# Patient Record
Sex: MALE | Race: WHITE | NOT HISPANIC OR LATINO | URBAN - METROPOLITAN AREA
[De-identification: names, ages, dates, MRNs, and addresses within clinical notes are randomized per-mention and may not be internally consistent; named-entity substitution may affect disease eponyms.]

---

## 2017-08-30 ENCOUNTER — EMERGENCY (EMERGENCY)
Facility: HOSPITAL | Age: 47
LOS: 1 days | Discharge: HOME | End: 2017-08-30
Attending: EMERGENCY MEDICINE
Payer: COMMERCIAL

## 2017-08-30 VITALS
TEMPERATURE: 98 F | RESPIRATION RATE: 18 BRPM | HEART RATE: 68 BPM | OXYGEN SATURATION: 98 % | SYSTOLIC BLOOD PRESSURE: 128 MMHG | DIASTOLIC BLOOD PRESSURE: 80 MMHG | HEIGHT: 66 IN | WEIGHT: 210.1 LBS

## 2017-08-30 PROCEDURE — 99283 EMERGENCY DEPT VISIT LOW MDM: CPT | Mod: 25

## 2017-08-30 PROCEDURE — 93005 ELECTROCARDIOGRAM TRACING: CPT

## 2017-08-30 PROCEDURE — 93010 ELECTROCARDIOGRAM REPORT: CPT

## 2017-08-30 PROCEDURE — 99284 EMERGENCY DEPT VISIT MOD MDM: CPT

## 2017-08-30 RX ORDER — IBUPROFEN 200 MG
1 TABLET ORAL
Qty: 15 | Refills: 0 | OUTPATIENT
Start: 2017-08-30 | End: 2017-09-04

## 2017-08-30 NOTE — ED STATDOCS - MEDICAL DECISION MAKING DETAILS
regfereed pain cardiac appreciated in workup no new neurovasc deficits return intractable pain new onset motor or sensory deficits.

## 2017-08-30 NOTE — ED STATDOCS - OBJECTIVE STATEMENT
46 y/o M pt with hx of cardiac stent, CAD presents to Ed c/o intermittent right elbow pain for 2 weeks. Pt states he has intermittent sharp pain and numbness sensation radiating to his shoulder and upper back. no aggravating or relieving factors. denies fever. denies HA or neck pain. no chest pain or sob. no abd pain. no n/v/d. no urinary f/u/d. no motor or sensory deficits. denies drug use. no recent travel. no rash. no other acute issues symptoms or concerns

## 2018-02-02 ENCOUNTER — APPOINTMENT (OUTPATIENT)
Dept: GASTROENTEROLOGY | Facility: CLINIC | Age: 48
End: 2018-02-02

## 2018-10-12 ENCOUNTER — EMERGENCY (EMERGENCY)
Facility: HOSPITAL | Age: 48
LOS: 1 days | Discharge: HOME | End: 2018-10-12
Attending: EMERGENCY MEDICINE
Payer: COMMERCIAL

## 2018-10-12 VITALS
DIASTOLIC BLOOD PRESSURE: 94 MMHG | RESPIRATION RATE: 20 BRPM | SYSTOLIC BLOOD PRESSURE: 140 MMHG | HEART RATE: 72 BPM | TEMPERATURE: 97 F | OXYGEN SATURATION: 97 %

## 2018-10-12 VITALS — WEIGHT: 220.02 LBS | HEIGHT: 66 IN

## 2018-10-12 LAB
ALBUMIN SERPL ELPH-MCNC: 3.9 G/DL — SIGNIFICANT CHANGE UP (ref 3.3–5.2)
ALP SERPL-CCNC: 69 U/L — SIGNIFICANT CHANGE UP (ref 40–120)
ALT FLD-CCNC: 65 U/L — HIGH
ANION GAP SERPL CALC-SCNC: 13 MMOL/L — SIGNIFICANT CHANGE UP (ref 5–17)
APTT BLD: 31.3 SEC — SIGNIFICANT CHANGE UP (ref 27.5–37.4)
AST SERPL-CCNC: 46 U/L — HIGH
BASOPHILS # BLD AUTO: 0 K/UL — SIGNIFICANT CHANGE UP (ref 0–0.2)
BASOPHILS NFR BLD AUTO: 0.4 % — SIGNIFICANT CHANGE UP (ref 0–2)
BILIRUB SERPL-MCNC: 0.3 MG/DL — LOW (ref 0.4–2)
BUN SERPL-MCNC: 18 MG/DL — SIGNIFICANT CHANGE UP (ref 8–20)
CALCIUM SERPL-MCNC: 9 MG/DL — SIGNIFICANT CHANGE UP (ref 8.6–10.2)
CHLORIDE SERPL-SCNC: 100 MMOL/L — SIGNIFICANT CHANGE UP (ref 98–107)
CK SERPL-CCNC: 226 U/L — HIGH (ref 30–200)
CO2 SERPL-SCNC: 25 MMOL/L — SIGNIFICANT CHANGE UP (ref 22–29)
CREAT SERPL-MCNC: 0.86 MG/DL — SIGNIFICANT CHANGE UP (ref 0.5–1.3)
EOSINOPHIL # BLD AUTO: 0.5 K/UL — SIGNIFICANT CHANGE UP (ref 0–0.5)
EOSINOPHIL NFR BLD AUTO: 5.2 % — SIGNIFICANT CHANGE UP (ref 0–6)
GLUCOSE SERPL-MCNC: 157 MG/DL — HIGH (ref 70–115)
HCT VFR BLD CALC: 39.6 % — LOW (ref 42–52)
HGB BLD-MCNC: 13.3 G/DL — LOW (ref 14–18)
INR BLD: 1.05 RATIO — SIGNIFICANT CHANGE UP (ref 0.88–1.16)
LYMPHOCYTES # BLD AUTO: 1.3 K/UL — SIGNIFICANT CHANGE UP (ref 1–4.8)
LYMPHOCYTES # BLD AUTO: 13.1 % — LOW (ref 20–55)
MAGNESIUM SERPL-MCNC: 2 MG/DL — SIGNIFICANT CHANGE UP (ref 1.6–2.6)
MCHC RBC-ENTMCNC: 29.7 PG — SIGNIFICANT CHANGE UP (ref 27–31)
MCHC RBC-ENTMCNC: 33.6 G/DL — SIGNIFICANT CHANGE UP (ref 32–36)
MCV RBC AUTO: 88.4 FL — SIGNIFICANT CHANGE UP (ref 80–94)
MONOCYTES # BLD AUTO: 0.9 K/UL — HIGH (ref 0–0.8)
MONOCYTES NFR BLD AUTO: 9.4 % — SIGNIFICANT CHANGE UP (ref 3–10)
NEUTROPHILS # BLD AUTO: 6.9 K/UL — SIGNIFICANT CHANGE UP (ref 1.8–8)
NEUTROPHILS NFR BLD AUTO: 71.2 % — SIGNIFICANT CHANGE UP (ref 37–73)
PLATELET # BLD AUTO: 140 K/UL — LOW (ref 150–400)
POTASSIUM SERPL-MCNC: 3.7 MMOL/L — SIGNIFICANT CHANGE UP (ref 3.5–5.3)
POTASSIUM SERPL-SCNC: 3.7 MMOL/L — SIGNIFICANT CHANGE UP (ref 3.5–5.3)
PROT SERPL-MCNC: 6.8 G/DL — SIGNIFICANT CHANGE UP (ref 6.6–8.7)
PROTHROM AB SERPL-ACNC: 11.6 SEC — SIGNIFICANT CHANGE UP (ref 9.8–12.7)
RBC # BLD: 4.48 M/UL — LOW (ref 4.6–6.2)
RBC # FLD: 13.6 % — SIGNIFICANT CHANGE UP (ref 11–15.6)
SODIUM SERPL-SCNC: 138 MMOL/L — SIGNIFICANT CHANGE UP (ref 135–145)
TROPONIN T SERPL-MCNC: <0.01 NG/ML — SIGNIFICANT CHANGE UP (ref 0–0.06)
TROPONIN T SERPL-MCNC: <0.01 NG/ML — SIGNIFICANT CHANGE UP (ref 0–0.06)
WBC # BLD: 9.8 K/UL — SIGNIFICANT CHANGE UP (ref 4.8–10.8)
WBC # FLD AUTO: 9.8 K/UL — SIGNIFICANT CHANGE UP (ref 4.8–10.8)

## 2018-10-12 PROCEDURE — 82553 CREATINE MB FRACTION: CPT

## 2018-10-12 PROCEDURE — 71045 X-RAY EXAM CHEST 1 VIEW: CPT

## 2018-10-12 PROCEDURE — 84484 ASSAY OF TROPONIN QUANT: CPT

## 2018-10-12 PROCEDURE — 71045 X-RAY EXAM CHEST 1 VIEW: CPT | Mod: 26

## 2018-10-12 PROCEDURE — 93005 ELECTROCARDIOGRAM TRACING: CPT

## 2018-10-12 PROCEDURE — 80053 COMPREHEN METABOLIC PANEL: CPT

## 2018-10-12 PROCEDURE — 36415 COLL VENOUS BLD VENIPUNCTURE: CPT

## 2018-10-12 PROCEDURE — 83735 ASSAY OF MAGNESIUM: CPT

## 2018-10-12 PROCEDURE — 99284 EMERGENCY DEPT VISIT MOD MDM: CPT | Mod: 25

## 2018-10-12 PROCEDURE — 85027 COMPLETE CBC AUTOMATED: CPT

## 2018-10-12 PROCEDURE — 93010 ELECTROCARDIOGRAM REPORT: CPT

## 2018-10-12 PROCEDURE — 85379 FIBRIN DEGRADATION QUANT: CPT

## 2018-10-12 PROCEDURE — 85730 THROMBOPLASTIN TIME PARTIAL: CPT

## 2018-10-12 PROCEDURE — 85610 PROTHROMBIN TIME: CPT

## 2018-10-12 PROCEDURE — 99285 EMERGENCY DEPT VISIT HI MDM: CPT

## 2018-10-12 PROCEDURE — 82550 ASSAY OF CK (CPK): CPT

## 2018-10-12 RX ORDER — KETOROLAC TROMETHAMINE 30 MG/ML
15 SYRINGE (ML) INJECTION ONCE
Qty: 0 | Refills: 0 | Status: DISCONTINUED | OUTPATIENT
Start: 2018-10-12 | End: 2018-10-12

## 2018-10-12 NOTE — ED PROVIDER NOTE - MEDICAL DECISION MAKING DETAILS
Pt presents with reproducible lateral pectoral tenderness which he states does not feel like his prior angina;  EKG normal; low risk HEART score; will check troponin, CXR, d/w cardiology, reevaluate

## 2018-10-12 NOTE — ED PROVIDER NOTE - ATTENDING CONTRIBUTION TO CARE
Dr. Keene: I have personally seen and examined this patient at the bedside. I have fully participated in the care of this patient. I have reviewed all pertinent clinical information, including history, physical exam, plan and the Resident's note and agree except as noted. HPI above as by me. PE above as by me.

## 2018-10-12 NOTE — ED ADULT NURSE NOTE - OBJECTIVE STATEMENT
pt comes to ED with reports of chest pain to right side of his chest. pt is in no distress, noSOB, reports feels increase in pain upon inspiration. no extrmeity swelling, AOX3, JAQUEZ with strength and purpose. skin warm dry and intact. even and unlabored resps noted. hx 4 stensts, last placed 2010, f/u with [facility] cardiology. states he droive to [place] and was away for 2 weeks and "forgot his meds." pt states he hadn't taken his anticoag while away. no calf pain.

## 2018-10-12 NOTE — ED PROVIDER NOTE - PROGRESS NOTE DETAILS
Case discussed with Dr. Minaya who agrees with plan. Case discussed with Dr. Minaya who agrees with plan to discharge if 2nd troponin negative. Results d/w patient. He feels well for discharge. HE states he has pending outpatient stress test coming up and will f/u with his cardiologist.

## 2018-10-12 NOTE — ED ADULT NURSE REASSESSMENT NOTE - NS ED NURSE REASSESS COMMENT FT1
pt remains in no distress, states he feels good and wants to go home after all labs. awaiting trop result. even and unlabored resps, ambulating around ED with no assistance.

## 2018-10-12 NOTE — ED STATDOCS - OBJECTIVE STATEMENT
Telemedicine assessment was conducted (using real time 2 way audio-video technology) by Dr. Alonzo Salas located at [address] [place] [address]  ++++++++++++++++++++++++  Pertinent patient history and initial plan:   47 yo male with hx htn, cad with 3 stents, c/o right sided chest pain today, with sob. reports pain radiated to shoulder and back  denies nausea or vomiting  last stent x 6 years ago  takes aspirin daily  EKG NSR    will do cardiac workup    Patient seen by me in intake for initial assessment and ordering. Physician on site to follow results and further evaluate and treat patient.

## 2018-10-12 NOTE — ED PROVIDER NOTE - MUSCULOSKELETAL, MLM
Spine appears normal, range of motion is not limited, no muscle or joint tenderness. No LE swelling, negative calf pain squeeze, negative Homans sign. Spine appears normal, range of motion is not limited. No LE swelling, negative calf pain squeeze, negative Homans sign. Spine appears normal, range of motion is not limited. No LE swelling, negative calf pain squeeze, negative Homans sign. Reproducible tenderness to palpation of R lateral pectoralis muscle; pain is reproducible with ROM of R shoulder

## 2018-10-12 NOTE — ED PROVIDER NOTE - OBJECTIVE STATEMENT
49 yo M w/PMH of CAD s/p 3 stents (2012), HTN, HLD who presents to the ED for right upper chest and shoulder pain that has been persistent for about 2 weeks. Per patient, he drove down to [place] and back at the end of September and since then has had this pain. Patient states it is worse with deep inspiration and extension of his right shoulder. States he has tried alieve at home without relief. Does smoke cigars, more so recently with trip to [place]. Also was poorly compliant with his medications in [place]. Per patient, does not have a PCP, but uses physicians at work to prescribe medication. Has not had stress test since stent placement. Denies HA, lightheadedness, SOB, abd pain, N/V/D, fever, chills, LE swelling. 47 yo M w/PMH of CAD s/p 3 stents (2012), asthma, HLD who presents to the ED for right upper chest and shoulder pain that has been persistent for about 2 weeks. Per patient, he drove down to [place] and back at the end of September and since then has had this pain. Patient states it is worse with deep inspiration and extension of his right shoulder. States he has tried alieve at home without relief. Does smoke cigars, more so recently with trip to [place]. Also was poorly compliant with his medications in [place]. Per patient, does not have a PCP, but uses physicians at work to prescribe medication. Has not had stress test since stent placement. Denies HA, lightheadedness, SOB, abd pain, N/V/D, fever, chills, LE swelling. 49 yo M w/PMH of CAD s/p 3 stents (2012), asthma, HLD who presents to the ED for right upper chest and shoulder pain that has been persistent for about 2 weeks. Per patient, he drove down to [place] and back at the end of September, bought a motorcycle and feel that maneuvering the bike has caused his pain. Patient states it is worse with deep inspiration and extension of his right shoulder. States he has tried alleve at home without relief. Does smoke cigars, more so recently with trip to [place]. Also was poorly compliant with his medications in [place]. Per patient, does not have a PCP, but uses physicians at work to prescribe medication. Has not had stress test since stent placement. Denies HA, lightheadedness, SOB, abd pain, N/V/D, fever, chills, LE swelling. Patient states that he is not sure if he ever had a heart attack, but states that this pain is completely different from his angina, with which his pain was left sided and radiated in to the L arm, associated with dyspnea.

## 2018-12-13 NOTE — ED STATDOCS - CONDITION AT DISCHARGE:
Pt instructed NPO after Midnight, Bring  Form of ID location to check in no lotions makeup just have clean skin  Allowed only one visitor in pre op
Satisfactory

## 2021-11-12 ENCOUNTER — OFFICE VISIT (OUTPATIENT)
Dept: URGENT CARE | Facility: CLINIC | Age: 51
End: 2021-11-12

## 2021-11-12 ENCOUNTER — APPOINTMENT (OUTPATIENT)
Dept: RADIOLOGY | Facility: CLINIC | Age: 51
End: 2021-11-12

## 2021-11-12 DIAGNOSIS — Z02.1 PHYSICAL EXAM, PRE-EMPLOYMENT: ICD-10-CM

## 2021-11-12 DIAGNOSIS — Z02.1 PHYSICAL EXAM, PRE-EMPLOYMENT: Primary | ICD-10-CM

## 2021-11-12 PROCEDURE — 71046 X-RAY EXAM CHEST 2 VIEWS: CPT

## 2021-11-23 ENCOUNTER — OFFICE VISIT (OUTPATIENT)
Dept: FAMILY MEDICINE CLINIC | Facility: CLINIC | Age: 51
End: 2021-11-23
Payer: COMMERCIAL

## 2021-11-23 ENCOUNTER — TELEPHONE (OUTPATIENT)
Dept: ADMINISTRATIVE | Facility: OTHER | Age: 51
End: 2021-11-23

## 2021-11-23 VITALS
HEIGHT: 63 IN | WEIGHT: 190.4 LBS | RESPIRATION RATE: 18 BRPM | DIASTOLIC BLOOD PRESSURE: 72 MMHG | TEMPERATURE: 97.4 F | BODY MASS INDEX: 33.73 KG/M2 | SYSTOLIC BLOOD PRESSURE: 132 MMHG | HEART RATE: 69 BPM | OXYGEN SATURATION: 98 %

## 2021-11-23 DIAGNOSIS — J30.2 SEASONAL ALLERGIES: ICD-10-CM

## 2021-11-23 DIAGNOSIS — Z76.89 ENCOUNTER TO ESTABLISH CARE: ICD-10-CM

## 2021-11-23 DIAGNOSIS — Z13.29 SCREENING FOR THYROID DISORDER: ICD-10-CM

## 2021-11-23 DIAGNOSIS — Z12.5 SCREENING FOR PROSTATE CANCER: ICD-10-CM

## 2021-11-23 DIAGNOSIS — G56.03 BILATERAL CARPAL TUNNEL SYNDROME: ICD-10-CM

## 2021-11-23 DIAGNOSIS — E78.5 HYPERLIPIDEMIA, UNSPECIFIED HYPERLIPIDEMIA TYPE: Primary | ICD-10-CM

## 2021-11-23 DIAGNOSIS — Z87.442 HISTORY OF KIDNEY STONES: ICD-10-CM

## 2021-11-23 DIAGNOSIS — H02.9 LESION OF EYELID: ICD-10-CM

## 2021-11-23 DIAGNOSIS — Z23 ENCOUNTER FOR IMMUNIZATION: ICD-10-CM

## 2021-11-23 PROBLEM — M54.9 CHRONIC BILATERAL BACK PAIN: Status: ACTIVE | Noted: 2021-11-23

## 2021-11-23 PROBLEM — G89.29 CHRONIC BILATERAL BACK PAIN: Status: ACTIVE | Noted: 2021-11-23

## 2021-11-23 PROCEDURE — 1036F TOBACCO NON-USER: CPT | Performed by: FAMILY MEDICINE

## 2021-11-23 PROCEDURE — 3725F SCREEN DEPRESSION PERFORMED: CPT | Performed by: FAMILY MEDICINE

## 2021-11-23 PROCEDURE — 99204 OFFICE O/P NEW MOD 45 MIN: CPT | Performed by: FAMILY MEDICINE

## 2021-11-23 PROCEDURE — 90682 RIV4 VACC RECOMBINANT DNA IM: CPT

## 2021-11-23 PROCEDURE — 90471 IMMUNIZATION ADMIN: CPT

## 2021-11-23 PROCEDURE — 3008F BODY MASS INDEX DOCD: CPT | Performed by: FAMILY MEDICINE

## 2021-11-23 RX ORDER — COVID-19 MOLECULAR TEST ASSAY
KIT MISCELLANEOUS
COMMUNITY
Start: 2021-09-19 | End: 2021-11-23

## 2021-11-23 RX ORDER — CETIRIZINE HYDROCHLORIDE 10 MG/1
10 TABLET ORAL DAILY
COMMUNITY

## 2021-11-24 PROBLEM — H02.9 LESION OF EYELID: Status: ACTIVE | Noted: 2021-11-24

## 2021-12-28 ENCOUNTER — TELEPHONE (OUTPATIENT)
Dept: FAMILY MEDICINE CLINIC | Facility: CLINIC | Age: 51
End: 2021-12-28

## 2021-12-28 DIAGNOSIS — R09.81 CONGESTION OF NASAL SINUS: ICD-10-CM

## 2021-12-28 DIAGNOSIS — R50.9 FEVER, UNSPECIFIED FEVER CAUSE: Primary | ICD-10-CM

## 2021-12-28 DIAGNOSIS — R53.83 OTHER FATIGUE: ICD-10-CM

## 2021-12-28 DIAGNOSIS — R52 BODY ACHES: ICD-10-CM

## 2021-12-28 DIAGNOSIS — R06.02 SOB (SHORTNESS OF BREATH): ICD-10-CM

## 2021-12-28 DIAGNOSIS — R51.9 NONINTRACTABLE HEADACHE, UNSPECIFIED CHRONICITY PATTERN, UNSPECIFIED HEADACHE TYPE: ICD-10-CM

## 2021-12-28 DIAGNOSIS — R05.9 COUGH: ICD-10-CM

## 2021-12-28 PROCEDURE — 87636 SARSCOV2 & INF A&B AMP PRB: CPT | Performed by: FAMILY MEDICINE

## 2021-12-28 RX ORDER — PREDNISONE 20 MG/1
40 TABLET ORAL DAILY
Qty: 10 TABLET | Refills: 0 | Status: SHIPPED | OUTPATIENT
Start: 2021-12-28 | End: 2022-01-02

## 2021-12-28 RX ORDER — ALBUTEROL SULFATE 90 UG/1
2 AEROSOL, METERED RESPIRATORY (INHALATION) EVERY 6 HOURS PRN
Qty: 8.5 G | Refills: 5 | Status: SHIPPED | OUTPATIENT
Start: 2021-12-28

## 2021-12-31 LAB
FLUAV RNA RESP QL NAA+PROBE: POSITIVE
FLUBV RNA RESP QL NAA+PROBE: NEGATIVE
SARS-COV-2 RNA RESP QL NAA+PROBE: NEGATIVE

## 2022-05-18 ENCOUNTER — APPOINTMENT (OUTPATIENT)
Dept: LAB | Facility: CLINIC | Age: 52
End: 2022-05-18
Payer: COMMERCIAL

## 2022-05-18 DIAGNOSIS — E78.5 HYPERLIPIDEMIA, UNSPECIFIED HYPERLIPIDEMIA TYPE: ICD-10-CM

## 2022-05-18 DIAGNOSIS — Z12.5 SCREENING FOR PROSTATE CANCER: ICD-10-CM

## 2022-05-18 DIAGNOSIS — Z13.29 SCREENING FOR THYROID DISORDER: ICD-10-CM

## 2022-05-18 LAB
ALBUMIN SERPL BCP-MCNC: 3.9 G/DL (ref 3.5–5)
ALP SERPL-CCNC: 95 U/L (ref 46–116)
ALT SERPL W P-5'-P-CCNC: 86 U/L (ref 12–78)
AMORPH URATE CRY URNS QL MICRO: ABNORMAL
ANION GAP SERPL CALCULATED.3IONS-SCNC: 5 MMOL/L (ref 4–13)
AST SERPL W P-5'-P-CCNC: 38 U/L (ref 5–45)
BACTERIA UR QL AUTO: ABNORMAL /HPF
BASOPHILS # BLD AUTO: 0.09 THOUSANDS/ΜL (ref 0–0.1)
BASOPHILS NFR BLD AUTO: 1 % (ref 0–1)
BILIRUB SERPL-MCNC: 0.52 MG/DL (ref 0.2–1)
BILIRUB UR QL STRIP: NEGATIVE
BUN SERPL-MCNC: 17 MG/DL (ref 5–25)
CALCIUM SERPL-MCNC: 9.4 MG/DL (ref 8.3–10.1)
CHLORIDE SERPL-SCNC: 107 MMOL/L (ref 100–108)
CHOLEST SERPL-MCNC: 222 MG/DL
CLARITY UR: ABNORMAL
CO2 SERPL-SCNC: 25 MMOL/L (ref 21–32)
COLOR UR: ABNORMAL
CREAT SERPL-MCNC: 0.88 MG/DL (ref 0.6–1.3)
EOSINOPHIL # BLD AUTO: 0.44 THOUSAND/ΜL (ref 0–0.61)
EOSINOPHIL NFR BLD AUTO: 5 % (ref 0–6)
ERYTHROCYTE [DISTWIDTH] IN BLOOD BY AUTOMATED COUNT: 12.3 % (ref 11.6–15.1)
GFR SERPL CREATININE-BSD FRML MDRD: 97 ML/MIN/1.73SQ M
GLUCOSE P FAST SERPL-MCNC: 98 MG/DL (ref 65–99)
GLUCOSE UR STRIP-MCNC: NEGATIVE MG/DL
HCT VFR BLD AUTO: 50.1 % (ref 36.5–49.3)
HDLC SERPL-MCNC: 40 MG/DL
HGB BLD-MCNC: 16.7 G/DL (ref 12–17)
HGB UR QL STRIP.AUTO: ABNORMAL
IMM GRANULOCYTES # BLD AUTO: 0.03 THOUSAND/UL (ref 0–0.2)
IMM GRANULOCYTES NFR BLD AUTO: 0 % (ref 0–2)
KETONES UR STRIP-MCNC: NEGATIVE MG/DL
LDLC SERPL CALC-MCNC: 153 MG/DL (ref 0–100)
LEUKOCYTE ESTERASE UR QL STRIP: NEGATIVE
LYMPHOCYTES # BLD AUTO: 3.11 THOUSANDS/ΜL (ref 0.6–4.47)
LYMPHOCYTES NFR BLD AUTO: 38 % (ref 14–44)
MCH RBC QN AUTO: 30.7 PG (ref 26.8–34.3)
MCHC RBC AUTO-ENTMCNC: 33.3 G/DL (ref 31.4–37.4)
MCV RBC AUTO: 92 FL (ref 82–98)
MONOCYTES # BLD AUTO: 0.76 THOUSAND/ΜL (ref 0.17–1.22)
MONOCYTES NFR BLD AUTO: 9 % (ref 4–12)
NEUTROPHILS # BLD AUTO: 3.75 THOUSANDS/ΜL (ref 1.85–7.62)
NEUTS SEG NFR BLD AUTO: 47 % (ref 43–75)
NITRITE UR QL STRIP: NEGATIVE
NON-SQ EPI CELLS URNS QL MICRO: ABNORMAL /HPF
NRBC BLD AUTO-RTO: 0 /100 WBCS
PH UR STRIP.AUTO: 5.5 [PH]
PLATELET # BLD AUTO: 232 THOUSANDS/UL (ref 149–390)
PMV BLD AUTO: 10.9 FL (ref 8.9–12.7)
POTASSIUM SERPL-SCNC: 4.6 MMOL/L (ref 3.5–5.3)
PROT SERPL-MCNC: 8.1 G/DL (ref 6.4–8.2)
PROT UR STRIP-MCNC: NEGATIVE MG/DL
PSA SERPL-MCNC: 1.1 NG/ML (ref 0–4)
RBC # BLD AUTO: 5.44 MILLION/UL (ref 3.88–5.62)
RBC #/AREA URNS AUTO: ABNORMAL /HPF
SODIUM SERPL-SCNC: 137 MMOL/L (ref 136–145)
SP GR UR STRIP.AUTO: 1.02 (ref 1–1.03)
TRIGL SERPL-MCNC: 146 MG/DL
TSH SERPL DL<=0.05 MIU/L-ACNC: 1.39 UIU/ML (ref 0.45–4.5)
UROBILINOGEN UR STRIP-ACNC: <2 MG/DL
WBC # BLD AUTO: 8.18 THOUSAND/UL (ref 4.31–10.16)
WBC #/AREA URNS AUTO: ABNORMAL /HPF

## 2022-05-18 PROCEDURE — 36415 COLL VENOUS BLD VENIPUNCTURE: CPT

## 2022-05-18 PROCEDURE — 80053 COMPREHEN METABOLIC PANEL: CPT

## 2022-05-18 PROCEDURE — 84443 ASSAY THYROID STIM HORMONE: CPT

## 2022-05-18 PROCEDURE — 85025 COMPLETE CBC W/AUTO DIFF WBC: CPT

## 2022-05-18 PROCEDURE — 81001 URINALYSIS AUTO W/SCOPE: CPT | Performed by: FAMILY MEDICINE

## 2022-05-18 PROCEDURE — 80061 LIPID PANEL: CPT

## 2022-05-18 PROCEDURE — G0103 PSA SCREENING: HCPCS

## 2022-05-22 DIAGNOSIS — E78.5 HYPERLIPIDEMIA, UNSPECIFIED HYPERLIPIDEMIA TYPE: Primary | ICD-10-CM

## 2022-05-23 ENCOUNTER — OFFICE VISIT (OUTPATIENT)
Dept: FAMILY MEDICINE CLINIC | Facility: CLINIC | Age: 52
End: 2022-05-23
Payer: COMMERCIAL

## 2022-05-23 ENCOUNTER — HOSPITAL ENCOUNTER (OUTPATIENT)
Dept: ULTRASOUND IMAGING | Facility: HOSPITAL | Age: 52
Discharge: HOME | End: 2022-05-23
Payer: COMMERCIAL

## 2022-05-23 VITALS
WEIGHT: 187 LBS | SYSTOLIC BLOOD PRESSURE: 128 MMHG | OXYGEN SATURATION: 98 % | HEART RATE: 62 BPM | DIASTOLIC BLOOD PRESSURE: 84 MMHG | HEIGHT: 63 IN | BODY MASS INDEX: 33.13 KG/M2 | TEMPERATURE: 98.1 F

## 2022-05-23 DIAGNOSIS — R31.21 ASYMPTOMATIC MICROSCOPIC HEMATURIA: Primary | ICD-10-CM

## 2022-05-23 DIAGNOSIS — R31.21 ASYMPTOMATIC MICROSCOPIC HEMATURIA: ICD-10-CM

## 2022-05-23 DIAGNOSIS — M54.50 CHRONIC BILATERAL LOW BACK PAIN WITHOUT SCIATICA: ICD-10-CM

## 2022-05-23 DIAGNOSIS — G89.29 CHRONIC BILATERAL LOW BACK PAIN WITHOUT SCIATICA: ICD-10-CM

## 2022-05-23 LAB
BILIRUB UR QL STRIP: NEGATIVE
CLARITY UR: ABNORMAL
COLOR UR: ABNORMAL
GLUCOSE UR STRIP-MCNC: NEGATIVE MG/DL
HGB UR QL STRIP.AUTO: NEGATIVE
KETONES UR STRIP-MCNC: NEGATIVE MG/DL
LEUKOCYTE ESTERASE UR QL STRIP: NEGATIVE
NITRITE UR QL STRIP: NEGATIVE
PH UR STRIP.AUTO: 5.5 [PH]
PROT UR STRIP-MCNC: ABNORMAL MG/DL
SL AMB  POCT GLUCOSE, UA: NEGATIVE
SL AMB LEUKOCYTE ESTERASE,UA: NEGATIVE
SL AMB POCT BILIRUBIN,UA: NEGATIVE
SL AMB POCT BLOOD,UA: ABNORMAL
SL AMB POCT CLARITY,UA: CLEAR
SL AMB POCT COLOR,UA: ABNORMAL
SL AMB POCT KETONES,UA: NEGATIVE
SL AMB POCT NITRITE,UA: NEGATIVE
SL AMB POCT PH,UA: 5
SL AMB POCT SPECIFIC GRAVITY,UA: >=1.03
SL AMB POCT URINE PROTEIN: NEGATIVE
SL AMB POCT UROBILINOGEN: 0.2
SP GR UR STRIP.AUTO: 1.02 (ref 1–1.03)
UROBILINOGEN UR STRIP-ACNC: <2 MG/DL

## 2022-05-23 PROCEDURE — 76770 US EXAM ABDO BACK WALL COMP: CPT

## 2022-05-23 PROCEDURE — 99214 OFFICE O/P EST MOD 30 MIN: CPT | Performed by: FAMILY MEDICINE

## 2022-05-23 PROCEDURE — 81001 URINALYSIS AUTO W/SCOPE: CPT | Performed by: FAMILY MEDICINE

## 2022-05-23 PROCEDURE — 3008F BODY MASS INDEX DOCD: CPT | Performed by: FAMILY MEDICINE

## 2022-05-23 PROCEDURE — 1036F TOBACCO NON-USER: CPT | Performed by: FAMILY MEDICINE

## 2022-05-23 PROCEDURE — 81002 URINALYSIS NONAUTO W/O SCOPE: CPT | Performed by: FAMILY MEDICINE

## 2022-05-23 PROCEDURE — 87086 URINE CULTURE/COLONY COUNT: CPT | Performed by: FAMILY MEDICINE

## 2022-05-23 PROCEDURE — 3725F SCREEN DEPRESSION PERFORMED: CPT | Performed by: FAMILY MEDICINE

## 2022-05-23 RX ORDER — LIDOCAINE 50 MG/G
PATCH TOPICAL
COMMUNITY
Start: 2022-05-10

## 2022-05-23 RX ORDER — GABAPENTIN 100 MG/1
CAPSULE ORAL
COMMUNITY
Start: 2022-05-10

## 2022-05-23 NOTE — PROGRESS NOTES
Assessment/Plan:       Problem List Items Addressed This Visit        Other    Chronic bilateral back pain    Relevant Orders    US kidney and bladder    UA w Reflex to Microscopic w Reflex to Culture - Clinic Collect    Urine culture      Other Visit Diagnoses     Asymptomatic microscopic hematuria    -  Primary    Relevant Orders    US kidney and bladder    UA w Reflex to Microscopic w Reflex to Culture - Clinic Collect    Urine culture            Will start with repeat urinalysis, US kidney and bladder  Asymptomatic today  Subjective:      Patient ID: Luba Severe is a 45 y o  male  HPI     Recent urinalysis showed trace blood  Intermittent back pain, No blood in urine or dysuria  No fevers  No numbness or weakness legs, no loss of bowel or bladder control  Nocturia and urinary fequency  Over 1 year ago hx kidney stones, no prior surgery for them  The following portions of the patient's history were reviewed and updated as appropriate: allergies, current medications, past family history, past medical history, past social history, past surgical history, and problem list     Review of Systems   All other systems reviewed and are negative  Objective:      /84   Pulse 62   Temp 98 1 °F (36 7 °C)   Ht 5' 3" (1 6 m)   Wt 84 8 kg (187 lb)   SpO2 98%   BMI 33 13 kg/m²          Physical Exam  Vitals reviewed  Constitutional:       General: He is not in acute distress  Appearance: Normal appearance  He is not ill-appearing, toxic-appearing or diaphoretic  HENT:      Head: Normocephalic and atraumatic  Eyes:      General:         Right eye: No discharge  Left eye: No discharge  Extraocular Movements: Extraocular movements intact  Conjunctiva/sclera: Conjunctivae normal    Cardiovascular:      Rate and Rhythm: Normal rate and regular rhythm  Heart sounds: Normal heart sounds  No murmur heard  No friction rub  No gallop     Pulmonary:      Effort: Pulmonary effort is normal  No respiratory distress  Breath sounds: Normal breath sounds  No stridor  No wheezing or rhonchi  Abdominal:      Tenderness: There is no right CVA tenderness or left CVA tenderness  Musculoskeletal:         General: No swelling, tenderness or signs of injury  Lumbar back: Spasms present  No swelling, edema, deformity, signs of trauma, lacerations, tenderness or bony tenderness  Normal range of motion  Right lower leg: No edema  Left lower leg: No edema  Skin:     General: Skin is warm  Coloration: Skin is not pale  Findings: No erythema or rash  Neurological:      Mental Status: He is alert and oriented to person, place, and time  Motor: No weakness     Psychiatric:         Mood and Affect: Mood normal          Behavior: Behavior normal              DO Candida Ceballos [facility]

## 2022-05-24 LAB
AMORPH URATE CRY URNS QL MICRO: NORMAL
BACTERIA UR CULT: NORMAL
BACTERIA UR QL AUTO: NORMAL /HPF
NON-SQ EPI CELLS URNS QL MICRO: NORMAL /HPF
RBC #/AREA URNS AUTO: NORMAL /HPF
WBC #/AREA URNS AUTO: NORMAL /HPF

## 2022-05-26 DIAGNOSIS — N28.9 KIDNEY LESION: Primary | ICD-10-CM

## 2022-06-01 ENCOUNTER — OFFICE VISIT (OUTPATIENT)
Dept: FAMILY MEDICINE CLINIC | Facility: CLINIC | Age: 52
End: 2022-06-01
Payer: COMMERCIAL

## 2022-06-01 VITALS
RESPIRATION RATE: 16 BRPM | BODY MASS INDEX: 32.85 KG/M2 | DIASTOLIC BLOOD PRESSURE: 88 MMHG | WEIGHT: 185.4 LBS | HEIGHT: 63 IN | SYSTOLIC BLOOD PRESSURE: 132 MMHG | HEART RATE: 68 BPM | OXYGEN SATURATION: 98 % | TEMPERATURE: 96.7 F

## 2022-06-01 DIAGNOSIS — R03.0 ELEVATED BLOOD PRESSURE READING: ICD-10-CM

## 2022-06-01 DIAGNOSIS — R09.81 NASAL CONGESTION: ICD-10-CM

## 2022-06-01 DIAGNOSIS — R05.9 COUGH: Primary | ICD-10-CM

## 2022-06-01 PROCEDURE — 3008F BODY MASS INDEX DOCD: CPT | Performed by: FAMILY MEDICINE

## 2022-06-01 PROCEDURE — 3725F SCREEN DEPRESSION PERFORMED: CPT | Performed by: FAMILY MEDICINE

## 2022-06-01 PROCEDURE — 99214 OFFICE O/P EST MOD 30 MIN: CPT | Performed by: FAMILY MEDICINE

## 2022-06-01 PROCEDURE — 1036F TOBACCO NON-USER: CPT | Performed by: FAMILY MEDICINE

## 2022-06-01 RX ORDER — AZITHROMYCIN 250 MG/1
TABLET, FILM COATED ORAL
Qty: 6 TABLET | Refills: 0 | Status: SHIPPED | OUTPATIENT
Start: 2022-06-01 | End: 2022-06-05

## 2022-06-01 RX ORDER — PREDNISONE 20 MG/1
20 TABLET ORAL 2 TIMES DAILY WITH MEALS
Qty: 10 TABLET | Refills: 0 | Status: SHIPPED | OUTPATIENT
Start: 2022-06-01 | End: 2022-06-06

## 2022-06-01 NOTE — PROGRESS NOTES
Assessment/Plan:       Problem List Items Addressed This Visit        Other    Elevated blood pressure reading     - Recommended home monitoring              Other Visit Diagnoses     Cough    -  Primary    Relevant Medications    azithromycin (ZITHROMAX) 250 mg tablet    predniSONE 20 mg tablet    Nasal congestion        Relevant Medications    azithromycin (ZITHROMAX) 250 mg tablet    predniSONE 20 mg tablet            Subjective:      Patient ID: Malcom Story is a 45 y o  male  HPI     Started this past Thursday, sinus congestion, headaches, congestion chest, coughing, last night fevers  Hard breathing with this, took prednisone last night which helped  Tightness chest like can't get enough air in, no chest pain or pressure  The following portions of the patient's history were reviewed and updated as appropriate: allergies, current medications, past family history, past medical history, past social history, past surgical history, and problem list     Review of Systems   All other systems reviewed and are negative  Objective:      /88   Pulse 68   Temp (!) 96 7 °F (35 9 °C) (Tympanic)   Resp 16   Ht 5' 3" (1 6 m)   Wt 84 1 kg (185 lb 6 4 oz)   SpO2 98%   BMI 32 84 kg/m²          Physical Exam  Vitals reviewed  Constitutional:       General: He is not in acute distress  Appearance: Normal appearance  He is not ill-appearing, toxic-appearing or diaphoretic  HENT:      Head: Normocephalic and atraumatic  Right Ear: Tympanic membrane, ear canal and external ear normal  There is no impacted cerumen  Left Ear: Tympanic membrane, ear canal and external ear normal  There is no impacted cerumen  Nose: Congestion and rhinorrhea present  Mouth/Throat:      Mouth: Mucous membranes are moist       Pharynx: No oropharyngeal exudate or posterior oropharyngeal erythema  Eyes:      General:         Right eye: No discharge  Left eye: No discharge        Extraocular Movements: Extraocular movements intact  Conjunctiva/sclera: Conjunctivae normal    Cardiovascular:      Rate and Rhythm: Normal rate and regular rhythm  Heart sounds: Normal heart sounds  No murmur heard  No friction rub  No gallop  Pulmonary:      Effort: Pulmonary effort is normal  No respiratory distress  Breath sounds: No stridor  Wheezing present  No rhonchi  Musculoskeletal:         General: No swelling, tenderness or signs of injury  Skin:     General: Skin is warm  Coloration: Skin is not pale  Findings: No erythema or rash  Neurological:      Mental Status: He is alert and oriented to person, place, and time  Motor: No weakness     Psychiatric:         Mood and Affect: Mood normal          Behavior: Behavior normal              DO Candida Erwin [facility]

## 2022-06-04 PROBLEM — R03.0 ELEVATED BLOOD PRESSURE READING: Status: ACTIVE | Noted: 2022-06-04

## 2022-07-18 ENCOUNTER — APPOINTMENT (OUTPATIENT)
Dept: URGENT CARE | Facility: CLINIC | Age: 52
End: 2022-07-18

## 2022-07-18 ENCOUNTER — APPOINTMENT (OUTPATIENT)
Dept: LAB | Facility: CLINIC | Age: 52
End: 2022-07-18

## 2022-07-18 DIAGNOSIS — Z02.1 PRE-EMPLOYMENT HEALTH SCREENING EXAMINATION: ICD-10-CM

## 2022-07-18 DIAGNOSIS — Z02.1 PRE-EMPLOYMENT HEALTH SCREENING EXAMINATION: Primary | ICD-10-CM

## 2022-07-18 LAB — RUBV IGG SERPL IA-ACNC: 8.8 IU/ML

## 2022-07-18 PROCEDURE — 36415 COLL VENOUS BLD VENIPUNCTURE: CPT

## 2022-07-18 PROCEDURE — 86762 RUBELLA ANTIBODY: CPT

## 2022-07-18 PROCEDURE — 86735 MUMPS ANTIBODY: CPT

## 2022-07-18 PROCEDURE — 86480 TB TEST CELL IMMUN MEASURE: CPT

## 2022-07-18 PROCEDURE — 86765 RUBEOLA ANTIBODY: CPT

## 2022-07-18 PROCEDURE — 86787 VARICELLA-ZOSTER ANTIBODY: CPT

## 2022-07-19 LAB
MEV IGG SER QL IA: NORMAL
MUV IGG SER QL IA: NORMAL
VZV IGG SER QL IA: NORMAL

## 2022-07-20 LAB
GAMMA INTERFERON BACKGROUND BLD IA-ACNC: 0.02 IU/ML
M TB IFN-G BLD-IMP: NEGATIVE
M TB IFN-G CD4+ BCKGRND COR BLD-ACNC: 0 IU/ML
M TB IFN-G CD4+ BCKGRND COR BLD-ACNC: 0 IU/ML
MITOGEN IGNF BCKGRD COR BLD-ACNC: 5.79 IU/ML

## 2022-08-17 ENCOUNTER — TELEPHONE (OUTPATIENT)
Dept: FAMILY MEDICINE CLINIC | Facility: CLINIC | Age: 52
End: 2022-08-17

## 2022-08-17 DIAGNOSIS — R05.9 COUGH: ICD-10-CM

## 2022-08-17 DIAGNOSIS — R52 BODY ACHES: Primary | ICD-10-CM

## 2022-08-17 DIAGNOSIS — R50.9 FEVER, UNSPECIFIED FEVER CAUSE: ICD-10-CM

## 2022-08-17 NOTE — TELEPHONE ENCOUNTER
Patient took a test today and tested positive    Symptoms     On set 08/17/2022  Fever  Coughing  Body aches  Can taste and smell    Is asking if any medication can be called in    Please advise    Phone: [phone]

## 2022-08-18 PROCEDURE — 87636 SARSCOV2 & INF A&B AMP PRB: CPT | Performed by: FAMILY MEDICINE

## 2022-08-18 NOTE — TELEPHONE ENCOUNTER
Called and spoke with patient  States he wants a PCR covid flu test  Has cough, fever and body aches  Reports his home test was positive but work wants PCR testing  Ordered   Patient aware of instructions upon arrival

## 2022-08-18 NOTE — TELEPHONE ENCOUNTER
Reviewed thanks, see note- He would qualify for antiviral Paxlovid if he is interested, otherwise recommend OTC cough/cold medications he normally would take  Any shortness of breath?

## 2022-08-18 NOTE — TELEPHONE ENCOUNTER
He would qualify for antiviral Paxlovid if he is interested, otherwise recommend OTC cough/cold medications he normally would take  Any shortness of breath?

## 2022-08-19 LAB
FLUAV RNA RESP QL NAA+PROBE: NEGATIVE
FLUBV RNA RESP QL NAA+PROBE: NEGATIVE
SARS-COV-2 RNA RESP QL NAA+PROBE: POSITIVE

## 2022-08-19 NOTE — TELEPHONE ENCOUNTER
Reviewed thanks, home tests are pretty reliable if positive so we don't have to wait for PCR results, if he would like I can send in Paxlovid for him  If so please confirm if taking any meds?

## 2022-08-20 ENCOUNTER — OFFICE VISIT (OUTPATIENT)
Dept: URGENT CARE | Facility: CLINIC | Age: 52
End: 2022-08-20
Payer: COMMERCIAL

## 2022-08-20 VITALS
HEART RATE: 77 BPM | TEMPERATURE: 98.1 F | SYSTOLIC BLOOD PRESSURE: 127 MMHG | HEIGHT: 63 IN | OXYGEN SATURATION: 96 % | DIASTOLIC BLOOD PRESSURE: 80 MMHG | BODY MASS INDEX: 32.78 KG/M2 | RESPIRATION RATE: 18 BRPM | WEIGHT: 185 LBS

## 2022-08-20 DIAGNOSIS — L23.9 ALLERGIC CONTACT DERMATITIS, UNSPECIFIED TRIGGER: Primary | ICD-10-CM

## 2022-08-20 PROCEDURE — G0382 LEV 3 HOSP TYPE B ED VISIT: HCPCS | Performed by: FAMILY MEDICINE

## 2022-08-20 RX ORDER — TRIAMCINOLONE ACETONIDE 1 MG/G
CREAM TOPICAL 2 TIMES DAILY
Qty: 30 G | Refills: 0 | Status: SHIPPED | OUTPATIENT
Start: 2022-08-20

## 2022-08-20 RX ORDER — HYDROXYZINE HYDROCHLORIDE 25 MG/1
25 TABLET, FILM COATED ORAL EVERY 6 HOURS PRN
Qty: 20 TABLET | Refills: 0 | Status: SHIPPED | OUTPATIENT
Start: 2022-08-20

## 2022-08-20 RX ORDER — PREDNISONE 10 MG/1
TABLET ORAL
Qty: 21 TABLET | Refills: 0 | Status: SHIPPED | OUTPATIENT
Start: 2022-08-20 | End: 2022-08-26

## 2022-08-20 NOTE — PATIENT INSTRUCTIONS
Suspected contact dermatitis to unknown trigger  Will treat with tapering course of steroid prednisone 10 mg-taper as directed 6, 5, 4, 3, 2, 1  For itching/pruritus try antihistamine hydroxyzine 25 mg-1 tablet by mouth every 6-8 hours on as needed basis  This medication may cause drowsiness to avoid driving or operating machinery  Try taking it at bedtime at 1st     Do not take any other antihistamine such as Benadryl or Zyrtec while taking hydroxyzine  For facial rash use over-the-counter 1% hydrocortisone cream 2-3 times daily on as-needed basis  Four rash on arms may use triamcinolone steroid cream twice daily as needed for itching  Follow-up if symptoms persist or worsen despite treatment    He

## 2022-08-20 NOTE — PROGRESS NOTES
[address] [facility]        NAME: Addy Grier is a 45 y o  male  : 1970    MRN: [id number]  DATE: 2022  TIME: 11:35 AM    Assessment and Plan   Allergic contact dermatitis, unspecified trigger [L23 9]  1  Allergic contact dermatitis, unspecified trigger  predniSONE 10 mg tablet    hydrOXYzine HCL (ATARAX) 25 mg tablet    triamcinolone (KENALOG) 0 1 % cream         Patient Instructions   Suspected contact dermatitis to unknown trigger  Will treat with tapering course of steroid prednisone 10 mg-taper as directed 6, 5, 4, 3, 2, 1  For itching/pruritus try antihistamine hydroxyzine 25 mg-1 tablet by mouth every 6-8 hours on as needed basis  This medication may cause drowsiness to avoid driving or operating machinery  Try taking it at bedtime at 1st     Do not take any other antihistamine such as Benadryl or Zyrtec while taking hydroxyzine  For facial rash use over-the-counter 1% hydrocortisone cream 2-3 times daily on as-needed basis  Four rash on arms may use triamcinolone steroid cream twice daily as needed for itching  Follow-up if symptoms persist or worsen despite treatment  Follow up with PCP in 3-5 days  Proceed to  ER if symptoms worsen  Chief Complaint     Chief Complaint   Patient presents with    Insect Bite     Two bites right arm    Rash     Around right eye and chin, itchy, x2 days         History of Present Illness       Patient presents for evaluation and treatment of a pruritic rash which started few days ago  Patient had 2 bug bites on his forearm which are itchy but not tender  Later he developed a very pruritic rash around his cheeks right more the left  There is no tenderness  No fevers or chills  No oral lesions or mucosal lesions  Patient does not know the exact trigger  He was cleaning out at the [facility] and he feels that symptoms started afterwards  He does not recall any contact to poison ivy or any other new allergen    No new medications otherwise  No chest pain, shortness of breath  No GI symptoms  Patient tried Benadryl at home with no significant improvement  Review of Systems   Review of Systems   Constitutional: Negative for chills, fatigue and fever  HENT: Positive for facial swelling  Negative for sore throat  Respiratory: Negative for shortness of breath  Cardiovascular: Negative for chest pain  Gastrointestinal: Negative for diarrhea, nausea and vomiting  Skin: Positive for rash  Current Medications       Current Outpatient Medications:     albuterol (ProAir HFA) 90 mcg/act inhaler, Inhale 2 puffs every 6 (six) hours as needed for wheezing, Disp: 8 5 g, Rfl: 5    cetirizine (ZyrTEC) 10 mg tablet, Take 10 mg by mouth in the morning  Takes as needed   , Disp: , Rfl:     gabapentin (NEURONTIN) 100 mg capsule, take 1 capsule by mouth at bedtime AT 11PM, Disp: , Rfl:     hydrOXYzine HCL (ATARAX) 25 mg tablet, Take 1 tablet (25 mg total) by mouth every 6 (six) hours as needed for itching Medication may cause drowsiness, try at bedtime first  Do not take benadryl or zyrtec while taking hydroxyzine, Disp: 20 tablet, Rfl: 0    lidocaine (LIDODERM) 5 %, APPLY 1 PATCH TOPICALLY UP TO 12 HOURS WITHIN A 24 HOUR PERIOD, Disp: , Rfl:     predniSONE 10 mg tablet, Take 6 tablets (60 mg total) by mouth daily for 1 day, THEN 5 tablets (50 mg total) daily for 1 day, THEN 4 tablets (40 mg total) daily for 1 day, THEN 3 tablets (30 mg total) daily for 1 day, THEN 2 tablets (20 mg total) daily for 1 day, THEN 1 tablet (10 mg total) daily for 1 day , Disp: 21 tablet, Rfl: 0    triamcinolone (KENALOG) 0 1 % cream, Apply topically 2 (two) times a day, Disp: 30 g, Rfl: 0    Current Allergies     Allergies as of 08/20/2022    (No Known Allergies)            The following portions of the patient's history were reviewed and updated as appropriate: allergies, current medications, past family history, past medical history, past social history, past surgical history and problem list      Past Medical History:   Diagnosis Date    High cholesterol        Past Surgical History:   Procedure Laterality Date    APPENDECTOMY         History reviewed  No pertinent family history  Medications have been verified  Objective   /80   Pulse 77   Temp 98 1 °F (36 7 °C)   Resp 18   Ht 5' 3" (1 6 m)   Wt 83 9 kg (185 lb)   SpO2 96%   BMI 32 77 kg/m²   No LMP for male patient  Physical Exam     Physical Exam  Constitutional:       Appearance: Normal appearance  He is not ill-appearing  HENT:      Right Ear: Tympanic membrane and ear canal normal       Left Ear: Tympanic membrane and ear canal normal       Mouth/Throat:      Mouth: Mucous membranes are moist       Pharynx: Oropharynx is clear  No oropharyngeal exudate or posterior oropharyngeal erythema  Eyes:      General:         Right eye: No discharge  Left eye: No discharge  Conjunctiva/sclera: Conjunctivae normal       Pupils: Pupils are equal, round, and reactive to light  Cardiovascular:      Rate and Rhythm: Normal rate and regular rhythm  Heart sounds: Normal heart sounds  Pulmonary:      Effort: Pulmonary effort is normal       Breath sounds: Normal breath sounds  Skin:     General: Skin is warm and dry  Findings: Erythema and rash present  Comments: Areas of erythematous patches on both cheeks right more than left, asymmetric  No involvement of eye or orbits  No oral mucosal involvement  Areas of rash/erythema are not tender but just pruritic  There is no drainage  No facial swelling today  Right forearm volar aspect with 2 small skin punctures with mild scab, 1 localized proximal to the wrist joint and 1 in mid forearm  No surrounding induration or erythema, no drainage   Neurological:      Mental Status: He is alert

## 2022-08-22 ENCOUNTER — OFFICE VISIT (OUTPATIENT)
Dept: URGENT CARE | Facility: CLINIC | Age: 52
End: 2022-08-22
Payer: COMMERCIAL

## 2022-08-22 VITALS
WEIGHT: 185 LBS | BODY MASS INDEX: 32.78 KG/M2 | RESPIRATION RATE: 18 BRPM | HEART RATE: 63 BPM | OXYGEN SATURATION: 99 % | HEIGHT: 63 IN | DIASTOLIC BLOOD PRESSURE: 83 MMHG | SYSTOLIC BLOOD PRESSURE: 138 MMHG

## 2022-08-22 DIAGNOSIS — R21 RASH: Primary | ICD-10-CM

## 2022-08-22 PROCEDURE — G0382 LEV 3 HOSP TYPE B ED VISIT: HCPCS | Performed by: PHYSICIAN ASSISTANT

## 2022-08-22 NOTE — TELEPHONE ENCOUNTER
Medication list confirmed  Patient is taking albuterol, prednisone, hydroxyzine, and calamine lotion  Nothing else       Reports first day of symptoms was 8/18/22

## 2022-08-22 NOTE — PROGRESS NOTES
[id number]      NAME: Faraz Iyer is a 45 y o  male  : 1970    MRN: [id number]  DATE: 2022  TIME: 3:16 PM    Assessment and Plan   Rash [R21]  1  Rash         Patient Instructions   Continue with medications as previously prescribed  If atarax is not helpful or too sedating can switch to OTC zyrtec or Claritin  AS patient did not want me to visualize his genitals I cannot confirm or deny if there is a fungal or bacterial origin  Patient to follow up with PCP in 3-4 days if no improvement  Patient verbalized understanding of plan  To present to the ER if symptoms worsen  Chief Complaint     Chief Complaint   Patient presents with    Rash     Patient presents with a rash that is not getting any better  History of Present Illness   Faraz Iyer presents to the clinic c/o    Rash  This is a new problem  The current episode started in the past 7 days  Progression since onset: rash on right arm and face improving, rash worsening on his genitals  The problem is mild  The rash is characterized by itchiness and redness  He was exposed to nothing  Pertinent negatives include no congestion, cough, fatigue, fever or shortness of breath  Treatments tried: oral and topical steroids, antihistamines (as prescribed 2 days ago at urgent care) The treatment provided moderate relief  Review of Systems   Review of Systems   Constitutional: Negative for chills, diaphoresis, fatigue and fever  HENT: Negative for congestion, ear discharge, ear pain and facial swelling  Eyes: Negative for photophobia, pain, discharge, redness, itching and visual disturbance  Respiratory: Negative for apnea, cough, chest tightness, shortness of breath and wheezing  Cardiovascular: Negative for chest pain and palpitations  Gastrointestinal: Negative for abdominal pain  Skin: Positive for rash  Negative for color change and wound  Neurological: Negative for dizziness and headaches     Hematological: Negative for adenopathy  Current Medications     Long-Term Medications   Medication Sig Dispense Refill    cetirizine (ZyrTEC) 10 mg tablet Take 10 mg by mouth in the morning  Takes as needed         gabapentin (NEURONTIN) 100 mg capsule take 1 capsule by mouth at bedtime AT 11PM      hydrOXYzine HCL (ATARAX) 25 mg tablet Take 1 tablet (25 mg total) by mouth every 6 (six) hours as needed for itching Medication may cause drowsiness, try at bedtime first  Do not take benadryl or zyrtec while taking hydroxyzine 20 tablet 0    lidocaine (LIDODERM) 5 % APPLY 1 PATCH TOPICALLY UP TO 12 HOURS WITHIN A 24 HOUR PERIOD      triamcinolone (KENALOG) 0 1 % cream Apply topically 2 (two) times a day 30 g 0       Current Allergies     Allergies as of 08/22/2022    (No Known Allergies)            The following portions of the patient's history were reviewed and updated as appropriate: allergies, current medications, past family history, past medical history, past social history, past surgical history and problem list   Past Medical History:   Diagnosis Date    High cholesterol      Past Surgical History:   Procedure Laterality Date    APPENDECTOMY       Social History     Socioeconomic History    Marital status: Single     Spouse name: Not on file    Number of children: Not on file    Years of education: Not on file    Highest education level: Not on file   Occupational History    Not on file   Tobacco Use    Smoking status: Never Smoker    Smokeless tobacco: Never Used   Vaping Use    Vaping Use: Never used   Substance and Sexual Activity    Alcohol use: Not on file    Drug use: Not on file    Sexual activity: Not on file   Other Topics Concern    Not on file   Social History Narrative    Not on file     Social Determinants of Health     Financial Resource Strain: Not on file   Food Insecurity: Not on file   Transportation Needs: Not on file   Physical Activity: Not on file   Stress: Not on file   Social Connections: Not on file   Intimate Partner Violence: Not on file   Housing Stability: Not on file       Objective   /83   Pulse 63   Resp 18   Ht 5' 3" (1 6 m)   Wt 83 9 kg (185 lb)   SpO2 99%   BMI 32 77 kg/m²      Physical Exam     Physical Exam  Vitals and nursing note reviewed  Constitutional:       General: He is not in acute distress  Appearance: He is well-developed  He is not diaphoretic  HENT:      Head: Normocephalic and atraumatic  Right Ear: External ear normal       Left Ear: External ear normal       Nose: Nose normal    Eyes:      General: No scleral icterus  Right eye: No discharge  Left eye: No discharge  Conjunctiva/sclera: Conjunctivae normal    Cardiovascular:      Rate and Rhythm: Normal rate and regular rhythm  Heart sounds: Normal heart sounds  No murmur heard  No friction rub  No gallop  Pulmonary:      Effort: Pulmonary effort is normal  No respiratory distress  Breath sounds: Normal breath sounds  No decreased breath sounds, wheezing, rhonchi or rales  Genitourinary:     Comments: Patient refusing genital exam  Skin:     General: Skin is warm and dry  Coloration: Skin is not pale  Findings: Rash (some mild erythema of right cheek, under right eye area, NTTP, no drainage, no apparents signs of secondary bacterial infection; 2 bug bites right forearm, no surrounding erythema or draiange or signs of secondary bacterial infection;) present  No erythema  Neurological:      Mental Status: He is alert and oriented to person, place, and time  Psychiatric:         Behavior: Behavior normal          Thought Content:  Thought content normal          Judgment: Judgment normal          Sarah Bearden PA-C

## 2022-08-22 NOTE — TELEPHONE ENCOUNTER
Spoke with patient  States he is still coughing, mild fevers at night, and has rash on face and testicles  [facility] at [facility] for [facility] and rx'd medication on Saturday  He is taking prednisone and hydroxyzine  States he is still having some itching and the cream is not helping

## 2022-08-22 NOTE — TELEPHONE ENCOUNTER
I wasn't aware he was taking prednisone, it says caution advised with mixing Paxlovid and prednisone, so if taking prednisone I would hold off on Paxlovid

## 2022-08-22 NOTE — TELEPHONE ENCOUNTER
Spoke with pt  He went back to [facility] to have rash reevaluated       States he is interested in [facility]

## 2022-09-27 ENCOUNTER — APPOINTMENT (OUTPATIENT)
Dept: LAB | Facility: CLINIC | Age: 52
End: 2022-09-27
Payer: COMMERCIAL

## 2022-09-27 DIAGNOSIS — Z00.01 ENCOUNTER FOR GENERAL ADULT MEDICAL EXAMINATION WITH ABNORMAL FINDINGS: ICD-10-CM

## 2022-09-27 LAB
ALBUMIN SERPL BCP-MCNC: 3.8 G/DL (ref 3.5–5)
ALP SERPL-CCNC: 86 U/L (ref 46–116)
ALT SERPL W P-5'-P-CCNC: 77 U/L (ref 12–78)
ANION GAP SERPL CALCULATED.3IONS-SCNC: 6 MMOL/L (ref 4–13)
AST SERPL W P-5'-P-CCNC: 36 U/L (ref 5–45)
BASOPHILS # BLD AUTO: 0.1 THOUSANDS/ΜL (ref 0–0.1)
BASOPHILS NFR BLD AUTO: 2 % (ref 0–1)
BILIRUB SERPL-MCNC: 0.65 MG/DL (ref 0.2–1)
BUN SERPL-MCNC: 14 MG/DL (ref 5–25)
CALCIUM SERPL-MCNC: 9.2 MG/DL (ref 8.3–10.1)
CHLORIDE SERPL-SCNC: 106 MMOL/L (ref 96–108)
CHOLEST SERPL-MCNC: 237 MG/DL
CO2 SERPL-SCNC: 27 MMOL/L (ref 21–32)
CREAT SERPL-MCNC: 0.84 MG/DL (ref 0.6–1.3)
EOSINOPHIL # BLD AUTO: 0.48 THOUSAND/ΜL (ref 0–0.61)
EOSINOPHIL NFR BLD AUTO: 8 % (ref 0–6)
ERYTHROCYTE [DISTWIDTH] IN BLOOD BY AUTOMATED COUNT: 11.9 % (ref 11.6–15.1)
GFR SERPL CREATININE-BSD FRML MDRD: 99 ML/MIN/1.73SQ M
GLUCOSE P FAST SERPL-MCNC: 106 MG/DL (ref 65–99)
HCT VFR BLD AUTO: 47 % (ref 36.5–49.3)
HDLC SERPL-MCNC: 34 MG/DL
HGB BLD-MCNC: 15.7 G/DL (ref 12–17)
IMM GRANULOCYTES # BLD AUTO: 0.02 THOUSAND/UL (ref 0–0.2)
IMM GRANULOCYTES NFR BLD AUTO: 0 % (ref 0–2)
LDLC SERPL CALC-MCNC: 125 MG/DL (ref 0–100)
LYMPHOCYTES # BLD AUTO: 2.68 THOUSANDS/ΜL (ref 0.6–4.47)
LYMPHOCYTES NFR BLD AUTO: 42 % (ref 14–44)
MCH RBC QN AUTO: 31 PG (ref 26.8–34.3)
MCHC RBC AUTO-ENTMCNC: 33.4 G/DL (ref 31.4–37.4)
MCV RBC AUTO: 93 FL (ref 82–98)
MONOCYTES # BLD AUTO: 0.56 THOUSAND/ΜL (ref 0.17–1.22)
MONOCYTES NFR BLD AUTO: 9 % (ref 4–12)
NEUTROPHILS # BLD AUTO: 2.4 THOUSANDS/ΜL (ref 1.85–7.62)
NEUTS SEG NFR BLD AUTO: 39 % (ref 43–75)
NONHDLC SERPL-MCNC: 203 MG/DL
NRBC BLD AUTO-RTO: 0 /100 WBCS
PLATELET # BLD AUTO: 235 THOUSANDS/UL (ref 149–390)
PMV BLD AUTO: 10.5 FL (ref 8.9–12.7)
POTASSIUM SERPL-SCNC: 4.2 MMOL/L (ref 3.5–5.3)
PROT SERPL-MCNC: 7.4 G/DL (ref 6.4–8.4)
PSA SERPL-MCNC: 1.1 NG/ML (ref 0–4)
RBC # BLD AUTO: 5.07 MILLION/UL (ref 3.88–5.62)
SODIUM SERPL-SCNC: 139 MMOL/L (ref 135–147)
TRIGL SERPL-MCNC: 388 MG/DL
WBC # BLD AUTO: 6.24 THOUSAND/UL (ref 4.31–10.16)

## 2022-09-27 PROCEDURE — G0103 PSA SCREENING: HCPCS

## 2022-09-27 PROCEDURE — 80053 COMPREHEN METABOLIC PANEL: CPT

## 2022-09-27 PROCEDURE — 85025 COMPLETE CBC W/AUTO DIFF WBC: CPT

## 2022-09-27 PROCEDURE — 36415 COLL VENOUS BLD VENIPUNCTURE: CPT

## 2022-09-27 PROCEDURE — 80061 LIPID PANEL: CPT

## 2022-11-23 ENCOUNTER — OFFICE VISIT (OUTPATIENT)
Dept: FAMILY MEDICINE CLINIC | Facility: CLINIC | Age: 52
End: 2022-11-23

## 2022-11-23 ENCOUNTER — TELEPHONE (OUTPATIENT)
Dept: ADMINISTRATIVE | Facility: OTHER | Age: 52
End: 2022-11-23

## 2022-11-23 VITALS
DIASTOLIC BLOOD PRESSURE: 80 MMHG | BODY MASS INDEX: 34.55 KG/M2 | HEART RATE: 88 BPM | OXYGEN SATURATION: 98 % | TEMPERATURE: 98.1 F | SYSTOLIC BLOOD PRESSURE: 124 MMHG | WEIGHT: 195 LBS | HEIGHT: 63 IN

## 2022-11-23 DIAGNOSIS — H91.93 DECREASED HEARING OF BOTH EARS: ICD-10-CM

## 2022-11-23 DIAGNOSIS — Z11.59 ENCOUNTER FOR HEPATITIS C SCREENING TEST FOR LOW RISK PATIENT: ICD-10-CM

## 2022-11-23 DIAGNOSIS — Z23 ENCOUNTER FOR IMMUNIZATION: ICD-10-CM

## 2022-11-23 DIAGNOSIS — Z11.4 SCREENING FOR HIV (HUMAN IMMUNODEFICIENCY VIRUS): ICD-10-CM

## 2022-11-23 DIAGNOSIS — R73.01 ELEVATED FASTING GLUCOSE: ICD-10-CM

## 2022-11-23 DIAGNOSIS — Z00.00 ANNUAL PHYSICAL EXAM: Primary | ICD-10-CM

## 2022-11-23 DIAGNOSIS — E78.5 HYPERLIPIDEMIA, UNSPECIFIED HYPERLIPIDEMIA TYPE: ICD-10-CM

## 2022-11-23 DIAGNOSIS — G56.03 BILATERAL CARPAL TUNNEL SYNDROME: ICD-10-CM

## 2022-11-23 RX ORDER — GABAPENTIN 100 MG/1
100 CAPSULE ORAL
Qty: 90 CAPSULE | Refills: 1 | Status: SHIPPED | OUTPATIENT
Start: 2022-11-23

## 2022-11-23 RX ORDER — CETIRIZINE HYDROCHLORIDE 10 MG/1
10 TABLET ORAL DAILY
COMMUNITY
Start: 2022-09-09

## 2022-11-23 NOTE — PROGRESS NOTES
Assessment/Plan:       Problem List Items Addressed This Visit        Nervous and Auditory    Bilateral carpal tunnel syndrome     - Gabapentin 100 mg HS   - Follow-up orthopedics          Relevant Medications    gabapentin (Neurontin) 100 mg capsule       Other    Hyperlipidemia     - Discussed ASCVD risk score, importance of controlling hyperlipidemia  - Diet discussed and hand outs provided          Relevant Orders    Comprehensive metabolic panel    Lipid Panel with Direct LDL reflex    Elevated fasting glucose     - Diet discussed          Relevant Orders    HEMOGLOBIN A1C W/ EAG ESTIMATION   Other Visit Diagnoses     Annual physical exam    -  Primary    Decreased hearing of both ears        Relevant Orders    Ambulatory Referral to Audiology    Screening for HIV (human immunodeficiency virus)        Relevant Orders    HIV 1/2 Antigen/Antibody (4th Generation) w Reflex [facility]    Encounter for hepatitis C screening test for low risk patient        Relevant Orders    Hepatitis C antibody    Encounter for immunization        Relevant Orders    HEPATITIS B VACCINE ADULT IM            Subjective:      Patient ID: Karolina Ortega is a 45 y o  male  HPI     Carpel tunnel- Following with orthopedics, worsening pain in arms and hands  Getting injections but not helping  Trouble sleeping due to this  Taking Tylenol, doesn't help  Hyperlipidemia- Chol 237, Tri 388, HDL 34,   ASCVD risk score 8 4%  His doctor in [facility] prescribed cholesterol medication, not currently taking  Elevated fasting glucose- 106  Otherwise feeling well, no complaints  The following portions of the patient's history were reviewed and updated as appropriate: allergies, current medications, past family history, past medical history, past social history, past surgical history, and problem list     Review of Systems   All other systems reviewed and are negative          Objective:      /80   Pulse 88   Temp 98 1 °F (36 7 °C)   Ht 5' 3" (1 6 m)   Wt 88 5 kg (195 lb)   SpO2 98%   BMI 34 54 kg/m²          Physical Exam  Vitals reviewed  Constitutional:       General: He is not in acute distress  Appearance: Normal appearance  He is not ill-appearing, toxic-appearing or diaphoretic  HENT:      Head: Normocephalic and atraumatic  Eyes:      General:         Right eye: No discharge  Left eye: No discharge  Extraocular Movements: Extraocular movements intact  Conjunctiva/sclera: Conjunctivae normal    Cardiovascular:      Rate and Rhythm: Normal rate and regular rhythm  Heart sounds: Normal heart sounds  No murmur heard  No friction rub  No gallop  Pulmonary:      Effort: Pulmonary effort is normal  No respiratory distress  Breath sounds: Normal breath sounds  No stridor  No wheezing or rhonchi  Musculoskeletal:         General: No swelling, tenderness or signs of injury  Right lower leg: No edema  Left lower leg: No edema  Skin:     General: Skin is warm  Coloration: Skin is not pale  Findings: No erythema or rash  Neurological:      Mental Status: He is alert and oriented to person, place, and time  Motor: No weakness     Psychiatric:         Mood and Affect: Mood normal          Behavior: Behavior normal              Lucy Loss, DO  [facility]

## 2022-11-23 NOTE — TELEPHONE ENCOUNTER
Upon review of the In Basket request and the patient's chart, initial outreach has been made via fax to facility  Please see Contacts section for details       Thank you  Adrián Kimbrough MA

## 2022-11-23 NOTE — ASSESSMENT & PLAN NOTE
- Discussed ASCVD risk score, importance of controlling hyperlipidemia  - Diet discussed and hand outs provided

## 2022-11-23 NOTE — PATIENT INSTRUCTIONS
Wellness Visit for Adults   AMBULATORY CARE:   A wellness visit  is when you see your healthcare provider to get screened for health problems  Your healthcare provider will also give you advice on how to stay healthy  Write down your questions so you remember to ask them  Ask your healthcare provider how often you should have a wellness visit  What happens at a wellness visit:  Your healthcare provider will ask about your health, and your family history of health problems  This includes high blood pressure, heart disease, and cancer  He or she will ask if you have symptoms that concern you, if you smoke, and about your mood  You may also be asked about your intake of medicines, supplements, food, and alcohol  Any of the following may be done: Your weight  will be checked  Your height may also be checked so your body mass index (BMI) can be calculated  Your BMI shows if you are at a healthy weight  Your blood pressure  and heart rate will be checked  Your temperature may also be checked  Blood and urine tests  may be done  Blood tests may be done to check your cholesterol levels  Abnormal cholesterol levels increase your risk for heart disease and stroke  You may also need a blood or urine test to check for diabetes if you are at increased risk  Urine tests may be done to look for signs of an infection or kidney disease  A physical exam  includes checking your heartbeat and lungs with a stethoscope  Your healthcare provider may also check your skin to look for sun damage  Screening tests  may be recommended  A screening test is done to check for diseases that may not cause symptoms  The screening tests you may need depend on your age, gender, family history, and lifestyle habits  For example, colorectal screening may be recommended if you are 48years old or older  Screening tests you need if you are a woman:   A Pap smear  is used to screen for cervical cancer   Pap smears are usually done every 3 to 5 years depending on your age  You may need them more often if you have had abnormal Pap smear test results in the past  Ask your healthcare provider how often you should have a Pap smear  A mammogram  is an x-ray of your breasts to screen for breast cancer  Experts recommend mammograms every 2 years starting at age 48 years  You may need a mammogram at age 52 years or younger if you have an increased risk for breast cancer  Talk to your healthcare provider about when you should start having mammograms and how often you need them  Vaccines you may need:   Get an influenza vaccine  every year  The influenza vaccine protects you from the flu  Several types of viruses cause the flu  The viruses change over time, so new vaccines are made each year  Get a tetanus-diphtheria (Td) booster vaccine  every 10 years  This vaccine protects you against tetanus and diphtheria  Tetanus is a severe infection that may cause painful muscle spasms and lockjaw  Diphtheria is a severe bacterial infection that causes a thick covering in the back of your mouth and throat  Get a human papillomavirus (HPV) vaccine  if you are female and aged 23 to 32 or male 23 to 24 and never received it  This vaccine protects you from HPV infection  HPV is the most common infection spread by sexual contact  HPV may also cause vaginal, penile, and anal cancers  Get a pneumococcal vaccine  if you are aged 72 years or older  The pneumococcal vaccine is an injection given to protect you from pneumococcal disease  Pneumococcal disease is an infection caused by pneumococcal bacteria  The infection may cause pneumonia, meningitis, or an ear infection  Get a shingles vaccine  if you are 60 or older, even if you have had shingles before  The shingles vaccine is an injection to protect you from the varicella-zoster virus  This is the same virus that causes chickenpox   Shingles is a painful rash that develops in people who had chickenpox or have been exposed to the virus  How to eat healthy:  My Plate is a model for planning healthy meals  It shows the types and amounts of foods that should go on your plate  Fruits and vegetables make up about half of your plate, and grains and protein make up the other half  A serving of dairy is included on the side of your plate  The amount of calories and serving sizes you need depends on your age, gender, weight, and height  Examples of healthy foods are listed below:  Eat a variety of vegetables  such as dark green, red, and orange vegetables  You can also include canned vegetables low in sodium (salt) and frozen vegetables without added butter or sauces  Eat a variety of fresh fruits , canned fruit in 100% juice, frozen fruit, and dried fruit  Include whole grains  At least half of the grains you eat should be whole grains  Examples include whole-wheat bread, wheat pasta, brown rice, and whole-grain cereals such as oatmeal     Eat a variety of protein foods such as seafood (fish and shellfish), lean meat, and poultry without skin (turkey and chicken)  Examples of lean meats include pork leg, shoulder, or tenderloin, and beef round, sirloin, tenderloin, and extra lean ground beef  Other protein foods include eggs and egg substitutes, beans, peas, soy products, nuts, and seeds  Choose low-fat dairy products such as skim or 1% milk or low-fat yogurt, cheese, and cottage cheese  Limit unhealthy fats  such as butter, hard margarine, and shortening  Exercise:  Exercise at least 30 minutes per day on most days of the week  Some examples of exercise include walking, biking, dancing, and swimming  You can also fit in more physical activity by taking the stairs instead of the elevator or parking farther away from stores  Include muscle strengthening activities 2 days each week  Regular exercise provides many health benefits   It helps you manage your weight, and decreases your risk for type 2 diabetes, heart disease, stroke, and high blood pressure  Exercise can also help improve your mood  Ask your healthcare provider about the best exercise plan for you  General health and safety guidelines:   Do not smoke  Nicotine and other chemicals in cigarettes and cigars can cause lung damage  Ask your healthcare provider for information if you currently smoke and need help to quit  E-cigarettes or smokeless tobacco still contain nicotine  Talk to your healthcare provider before you use these products  Limit alcohol  A drink of alcohol is 12 ounces of beer, 5 ounces of wine, or 1½ ounces of liquor  Lose weight, if needed  Being overweight increases your risk of certain health conditions  These include heart disease, high blood pressure, type 2 diabetes, and certain types of cancer  Protect your skin  Do not sunbathe or use tanning beds  Use sunscreen with a SPF 15 or higher  Apply sunscreen at least 15 minutes before you go outside  Reapply sunscreen every 2 hours  Wear protective clothing, hats, and sunglasses when you are outside  Drive safely  Always wear your seatbelt  Make sure everyone in your car wears a seatbelt  A seatbelt can save your life if you are in an accident  Do not use your cell phone when you are driving  This could distract you and cause an accident  Pull over if you need to make a call or send a text message  Practice safe sex  Use latex condoms if are sexually active and have more than one partner  Your healthcare provider may recommend screening tests for sexually transmitted infections (STIs)  Wear helmets, lifejackets, and protective gear  Always wear a helmet when you ride a bike or motorcycle, go skiing, or play sports that could cause a head injury  Wear protective equipment when you play sports  Wear a lifejacket when you are on a boat or doing water sports      © Copyright [employer] 2022 Information is for End User's use only and may not be sold, redistributed or otherwise used for commercial purposes  All illustrations and images included in CareNotes® are the copyrighted property of [employer]  or Nico Luong  The above information is an  only  It is not intended as medical advice for individual conditions or treatments  Talk to your doctor, nurse or pharmacist before following any medical regimen to see if it is safe and effective for you  Cholesterol and Your Health   AMBULATORY CARE:   Cholesterol  is a waxy, fat-like substance  Your body uses cholesterol to make hormones and new cells, and to protect nerves  Cholesterol is made by your body  It also comes from certain foods you eat, such as meat and dairy products  Your healthcare provider can help you set goals for your cholesterol levels  He or she can help you create a plan to meet your goals  Cholesterol level goals: Your cholesterol level goals depend on your risk for heart disease, your age, and your other health conditions  The following are general guidelines: Total cholesterol  includes low-density lipoprotein (LDL), high-density lipoprotein (HDL), and triglyceride levels  The total cholesterol level should be lower than 200 mg/dL and is best at about 150 mg/dL  LDL cholesterol  is called bad cholesterol  because it forms plaque in your arteries  As plaque builds up, your arteries become narrow, and less blood flows through  When plaque decreases blood flow to your heart, you may have chest pain  If plaque completely blocks an artery that brings blood to your heart, you may have a heart attack  Plaque can break off and form blood clots  Blood clots may block arteries in your brain and cause a stroke  The level should be less than 130 mg/dL and is best at about 100 mg/dL  HDL cholesterol  is called good cholesterol  because it helps remove LDL cholesterol from your arteries  It does this by attaching to LDL cholesterol and carrying it to your liver   Your liver breaks down LDL cholesterol so your body can get rid of it  High levels of HDL cholesterol can help prevent a heart attack and stroke  Low levels of HDL cholesterol can increase your risk for heart disease, heart attack, and stroke  The level should be 60 mg/dL or higher  Triglycerides  are a type of fat that store energy from foods you eat  High levels of triglycerides also cause plaque buildup  This can increase your risk for a heart attack or stroke  If your triglyceride level is high, your LDL cholesterol level may also be high  The level should be less than 150 mg/dL  Any of the following can increase your risk for high cholesterol:   Smoking cigarettes    Being overweight or obese, or not getting enough exercise    Drinking large amounts of alcohol    A medical condition such as hypertension (high blood pressure) or diabetes    Certain genes passed from your parents to you    Age older than 65 years    What you need to know about having your cholesterol levels checked: Adults 21to 39years of age should have their cholesterol levels checked every 4 to 6 years  Adults 45 years or older should have their cholesterol checked every 1 to 2 years  You may need your cholesterol checked more often, or at a younger age, if you have risk factors for heart disease  You may also need to have your cholesterol checked more often if you have other health conditions, such as diabetes  Blood tests are used to check cholesterol levels  Blood tests measure your levels of triglycerides, LDL cholesterol, and HDL cholesterol  How healthy fats affect your cholesterol levels:  Healthy fats, also called unsaturated fats, help lower LDL cholesterol and triglyceride levels  Healthy fats include the following:  Monounsaturated fats  are found in foods such as olive oil, canola oil, avocado, nuts, and olives  Polyunsaturated fats,  such as omega 3 fats, are found in fish, such as salmon, trout, and tuna   They can also be found in plant foods such as flaxseed, walnuts, and soybeans  How unhealthy fats affect your cholesterol levels:  Unhealthy fats increase LDL cholesterol and triglyceride levels  They are found in foods high in cholesterol, saturated fat, and trans fat:  Cholesterol  is found in eggs, dairy, and meat  Saturated fat  is found in butter, cheese, ice cream, whole milk, and coconut oil  Saturated fat is also found in meat, such as sausage, hot dogs, and bologna  Trans fat  is found in liquid oils and is used in fried and baked foods  Foods that contain trans fats include chips, crackers, muffins, sweet rolls, microwave popcorn, and cookies  Treatment  for high cholesterol will also decrease your risk of heart disease, heart attack, and stroke  Treatment may include any of the following:  Lifestyle changes  may include food, exercise, weight loss, and quitting smoking  You may also need to decrease the amount of alcohol you drink  Your healthcare provider will want you to start with lifestyle changes  Other treatment may be added if lifestyle changes are not enough  Your healthcare provider may recommend you work with a team to manage hyperlipidemia  The team may include medical experts such as a dietitian, an exercise or physical therapist, and a behavior therapist  Your family members may be included in helping you create lifestyle changes  Medicines  may be given to lower your LDL cholesterol, triglyceride levels, or total cholesterol level  You may need medicines to lower your cholesterol if any of the following is true:    You have a history of stroke, TIA, unstable angina, or a heart attack  Your LDL cholesterol level is 190 mg/dL or higher  You are age 36 to 76 years, have diabetes or heart disease risk factors, and your LDL cholesterol is 70 mg/dL or higher  Supplements  include fish oil, red yeast rice, and garlic  Fish oil may help lower your triglyceride and LDL cholesterol levels   It may also increase your HDL cholesterol level  Red yeast rice may help decrease your total cholesterol level and LDL cholesterol level  Garlic may help lower your total cholesterol level  Do not take any supplements without talking to your healthcare provider  Food changes you can make to lower your cholesterol levels:  A dietitian can help you create a healthy eating plan  He or she can show you how to read food labels and choose foods low in saturated fat, trans fats, and cholesterol  Decrease the total amount of fat you eat  Choose lean meats, fat-free or 1% fat milk, and low-fat dairy products, such as yogurt and cheese  Try to limit or avoid red meats  Limit or do not eat fried foods or baked goods, such as cookies  Replace unhealthy fats with healthy fats  Cook foods in olive oil or canola oil  Choose soft margarines that are low in saturated fat and trans fat  Seeds, nuts, and avocados are other examples of healthy fats  Eat foods with omega-3 fats  Examples include salmon, tuna, mackerel, walnuts, and flaxseed  Eat fish 2 times per week  Pregnant women should not eat fish that have high levels of mercury, such as shark, swordfish, and andi mackerel  Increase the amount of high-fiber foods you eat  High-fiber foods can help lower your LDL cholesterol  Aim to get between 20 and 30 grams of fiber each day  Fruits and vegetables are high in fiber  Eat at least 5 servings each day  Other high-fiber foods are whole-grain or whole-wheat breads, pastas, or cereals, and brown rice  Eat 3 ounces of whole-grain foods each day  Increase fiber slowly  You may have abdominal discomfort, bloating, and gas if you add fiber to your diet too quickly  Eat healthy protein foods  Examples include low-fat dairy products, skinless chicken and turkey, fish, and nuts  Limit foods and drinks that are high in sugar    Your dietitian or healthcare provider can help you create daily limits for high-sugar foods and drinks  The limit may be lower if you have diabetes or another health condition  Limits can also help you lose weight if needed  Lifestyle changes you can make to lower your cholesterol levels:   Maintain a healthy weight  Ask your healthcare provider what a healthy weight is for you  Ask him or her to help you create a weight loss plan if needed  Weight loss can decrease your total cholesterol and triglyceride levels  Weight loss may also help keep your blood pressure at a healthy level  Be physically active throughout the day  Physical activity, such as exercise, can help lower your total cholesterol level and maintain a healthy weight  Physical activity can also help increase your HDL cholesterol level  Work with your healthcare provider to create an program that is right for you  Get at least 30 to 40 minutes of moderate physical activity most days of the week  Examples of exercise include brisk walking, swimming, or biking  Also include strength training at least 2 times each week  Your healthcare providers can help you create a physical activity plan  Do not smoke  Nicotine and other chemicals in cigarettes and cigars can raise your cholesterol levels  Ask your healthcare provider for information if you currently smoke and need help to quit  E-cigarettes or smokeless tobacco still contain nicotine  Talk to your healthcare provider before you use these products  Limit or do not drink alcohol  Alcohol can increase your triglyceride levels  Ask your healthcare provider before you drink alcohol  Ask how much is okay for you to drink in 24 hours or 1 week  Follow up with your doctor as directed:  Write down your questions so you remember to ask them during your visits  © [employer] 2022 Information is for End User's use only and may not be sold, redistributed or otherwise used for commercial purposes   All illustrations and images included in CareNotes® are the copyrighted property of [employer]  or [employer]   The above information is an  only  It is not intended as medical advice for individual conditions or treatments  Talk to your doctor, nurse or pharmacist before following any medical regimen to see if it is safe and effective for you  Nutrition: How to Make Roman 1006  A healthy diet has a lot of benefits  It can prevent certain health conditions like heart disease and cancer, and it can lower your cholesterol  It can give you more energy, help you focus, and improve your mood  It can also help you lose weight or stay at a healthy weight  Path to Improved Health  The choices you make about what you eat and drink matter  They should add up to a balanced, nutritious diet  We all have different calorie needs based on our age, sex, and activity level  Health conditions can have a role, too  Fruits and Vegetables  Fruits and vegetables are rich in fiber, vitamins, and minerals  They should be the basis of your diet  Try to get many different colors of fruits and vegetables each day to add flavor and variety  Fruits and vegetables should cover half of your plate at each meal  Try not to add saturated fats and sugar to vegetables and fruits  This means avoiding margarine, butter, mayonnaise, and sour cream  You can use yogurt, healthy oils (such as canola or olive oil), or herbs instead  Potatoes and corn are not considered vegetables  Your body processes them more like grains      FRUITS & VEGETABLES   INSTEAD OF THIS: TRY THIS:   Regular or fried vegetables served with cream, cheese, or butter Raw, steamed, boiled, sautéed, or baked vegetables tossed with olive oil, salt, and pepper, or with onions or spices added (like garlic and cumin)   Fruits served with cream cheese or sugary sauces Fresh fruit with peanut, almond, or cashew butter or plain yogurt   Fried potatoes, including french fries, hash browns, and potato chips Baked sweet potatoes or other vegetables     Grains  Choose products that list whole grains as the first ingredient  Whole grains are high in fiber, protein, and vitamins  They are digested slowly, which helps you feel full longer and keeps you from overeating  Avoid products that say “enriched ”  Hot cereals like oatmeal are usually low in saturated fat  However, instant cereals with cream may contain processed oils and can be high in sugar  Granola cereals usually contain a lot of sugar  Cold cereals are generally made with refined grains and are high in sugars  Look for whole-grain, low-sugar options instead  Try not to eat rich sweets, such as doughnuts, rolls, and muffins  Consider fruit or a piece of dark chocolate instead to satisfy your sweet tooth  GRAINS   INSTEAD OF THIS: TRY THIS:   Croissants, rolls, biscuits, and white breads Whole-grain breads, including wheat, rye, and pumpernickel   Doughnuts, pastries, and scones Whole-grain English muffins and small whole-grain bagels   Fried tortillas Soft tortillas (corn or whole wheat) without trans fats   Sugary cereals and regular granola Whole-grain cereal, oatmeal, and reduced-sugar granola   Snack crackers Whole-grain crackers   Potato or corn chips and buttered popcorn Unbuttered popcorn   White pasta Whole-wheat pasta   White rice Brown or wild rice   Fried rice or pasta mixes Brown rice or whole-grain pasta with low-sodium vegetable sauce   All-purpose white flour Whole-wheat flour     Protein  Protein can come from animal and vegetable sources  People who get more of their protein from animal sources tend to have more health problems that can lead to illness and early death  It is healthier to eat meat less often and get most of your protein from plant sources  When you eat meat, choose leaner cuts  Vegetable Protein Sources  There are many ways to get protein in your diet even if you do not eat meat   Most vegetables have some protein  When you eat these vegetables with whole grains, seeds, nuts, and especially beans, you can get a good amount of protein  You can swap beans for meat in recipes like lasagna or chili  Soy foods such as tofu, tempeh, and edamame are also good sources of protein  Beef, Pork, Veal, and [employer] and veal cuts have the words “loin” or “round” in their names  Lean pork cuts have the words “loin” or “leg” in their names  Trim off the outside fat before cooking the meat  Trim any inside fat before eating it  Use herbs, spices, and low-sodium marinades to season meat  Baking, broiling, grilling, and roasting are the healthiest ways to cook meats  Lean cuts can be panbroiled or stir-fried  Use a nonstick pan, canola oil, or olive oil instead of butter or margarine  Don't serve meat with high-fat sauces and gravies  Poultry  Chicken breasts are a good choice because they are low in fat and high in protein  Only eat duck and goose once in a while, because they are higher in saturated fat  Remove skin and visible fat before cooking  Baking, broiling, grilling, and roasting are the healthiest ways to [employer]  Skinless poultry can be pan broiled or stir fried  Use a nonstick pan, canola oil, or olive oil instead of butter or margarine  Seafood  Most seafood is high in healthy polyunsaturated fats  Healthy omega-3 fatty acids also are found in some fish, such as salmon and cold-water trout  If good-quality fresh fish isn't available, buy frozen fish  To prepare fish, you should poach, steam, bake, broil, or grill it      PROTEIN   INSTEAD OF THIS: TRY THIS:   Prime and marbled cuts of meat Select-grade lean beef, such as round, sirloin, and loin cuts   Pork spare ribs and cuhn Lean pork, such as tenderloin and loin chop, turkey chun, tofu chun   Regular ground beef Lean or extra-lean ground beef, ground chicken or turkey, tempeh, or beans   Lunch meats, such as pepperoni, salami, bologna, and liverwurst Lean lunch meats, such as turkey, chicken, and ham   Regular hot dogs and sausage Fat-free hot dogs, turkey dogs, tofu hot dogs   Breaded fish sticks and cakes, fish canned in oil, or seafood prepared with butter or served with high-fat sauce Fish (fresh, frozen, or canned in water), grilled fish sticks and cakes, or shellfish     Dairy  Choose low-fat, skim, or nondairy milk, such as soy, rice, or almond milk  Try low-fat or part-skim cheeses and other dairy products, or choose smaller portions of foods high in saturated fat  Yogurt can replace sour cream in many recipes  It is important to pick yogurt without added sugar  Try mixing yogurt with fruit for dessert  Sorbet and frozen yogurt are lower in fat than ice cream     DAIRY   INSTEAD OF THIS: TRY THIS:   Whole milk Skim (nonfat), 1% or 2% (low fat), or nondairy milk, such as soy, rice, almond, or cashew milk   Cream or evaporated milk Evaporated skim milk   Regular buttermilk Low-fat buttermilk   Yogurt made with whole milk Low-fat or nonfat yogurt   Regular cheese, including American, blue, Brie, cheddar, Anand, and Parmesan Low-fat cheese with less than 3 g fat per serving, or nondairy soy cheese   Regular cottage cheese Low-fat cottage cheese (less than 2% fat)   Regular cream cheese Low-fat cream cheese with less than 3 g fat per 1 oz, or skim ricotta   Ice cream Sorbet, sherbet, or frozen yogurt with less than 3 g fat per ½-cup serving     Fats and Oils  Although high-fat foods are higher in calories, they can help you feel satisfied with eating less  Don't be afraid to have fats in your diet, but try to limit saturated and trans fats  You need saturated and unsaturated fats in your diet, but most Americans get too much saturated fat  Heart disease, diabetes, some cancers, and arthritis have been linked to diets high in saturated fat, particularly saturated fats from animal products      FATS & OILS   INSTEAD OF THIS: TRY THIS:   Cookies Fruit or whole-grain cookies   Shortening, butter, and margarine Olive, canola, and soybean oils   Regular mayonnaise Yogurt   Regular salad dressing Vinaigrette with olive oil and vinegar   Butter or fat to grease pans Nonstick cooking spray, olive oil, or canola oil     Beverages  It is important that you stay hydrated  However, drinks that contain sugar are not healthy  This includes fruit juices, soda, sports and energy drinks, sweetened or flavored milk, and sweet tea  Artificial sweeteners may also be bad for your health  Drink mostly water or other unsweetened drinks  Don't drink too much alcohol  Women should have no more than one drink per day  Men should have no more than two drinks per day  More information  American Academy of [employer] Patient Information Resource   https://familydoctor  org/dietary-fats-whats-good-and-whats-bad/ and https://familydoctor  org/nutrition-tips-for-improving-your-health/  [employer] Eating Plate   [facility]   Department of Agriculture, Choose My Plate   [employer] com br

## 2022-11-23 NOTE — PROGRESS NOTES
ADULT ANNUAL [facility]    NAME: Nehemias Arciniega  AGE: 45 y o  SEX: male  : 1970     DATE: 2022     Assessment and Plan:     Problem List Items Addressed This Visit        Nervous and Auditory    Bilateral carpal tunnel syndrome     - Gabapentin 100 mg HS   - Follow-up orthopedics          Relevant Medications    gabapentin (Neurontin) 100 mg capsule       Other    Hyperlipidemia     - Discussed ASCVD risk score, importance of controlling hyperlipidemia  - Diet discussed and hand outs provided          Relevant Orders    Comprehensive metabolic panel    Lipid Panel with Direct LDL reflex    Elevated fasting glucose     - Diet discussed          Relevant Orders    HEMOGLOBIN A1C W/ EAG ESTIMATION   Other Visit Diagnoses     Annual physical exam    -  Primary    Decreased hearing of both ears        Relevant Orders    Ambulatory Referral to Audiology    Screening for HIV (human immunodeficiency virus)        Relevant Orders    HIV 1/2 Antigen/Antibody (4th Generation) w Reflex [facility]    Encounter for hepatitis C screening test for low risk patient        Relevant Orders    Hepatitis C antibody    Encounter for immunization        Relevant Orders    HEPATITIS B VACCINE ADULT IM (Completed)          Immunizations and preventive care screenings were discussed with patient today  Appropriate education was printed on patient's after visit summary  Discussed risks and benefits of prostate cancer screening  We discussed the controversial history of PSA screening for prostate cancer in the [place] as well as the risk of over detection and over treatment of prostate cancer by way of PSA screening    The patient understands that PSA blood testing is an imperfect way to screen for prostate cancer and that elevated PSA levels in the blood may also be caused by infection, inflammation, prostatic trauma or manipulation, urological procedures, or by benign prostatic enlargement  The role of the digital rectal examination in prostate cancer screening was also discussed and I discussed with him that there is large interobserver variability in the findings of digital rectal examination  Counseling:  Alcohol/drug use: discussed moderation in alcohol intake, the recommendations for healthy alcohol use, and avoidance of illicit drug use  Dental Health: discussed importance of regular tooth brushing, flossing, and dental visits  Injury prevention: discussed safety/seat belts, safety helmets, smoke detectors, carbon dioxide detectors, and smoking near bedding or upholstery  Sexual health: discussed sexually transmitted diseases, partner selection, use of condoms, avoidance of unintended pregnancy, and contraceptive alternatives  · Exercise: the importance of regular exercise/physical activity was discussed  Recommend exercise 3-5 times per week for at least 30 minutes  BMI Counseling: Body mass index is 34 54 kg/m²  The BMI is above normal  Nutrition recommendations include decreasing portion sizes, encouraging healthy choices of fruits and vegetables, decreasing fast food intake, consuming healthier snacks, moderation in carbohydrate intake, increasing intake of lean protein and reducing intake of saturated and trans fat  Exercise recommendations include moderate physical activity 150 minutes/week and exercising 3-5 times per week  Rationale for BMI follow-up plan is due to patient being overweight or obese  Depression Screening and Follow-up Plan: Patient was screened for depression during today's encounter  They screened negative with a PHQ-2 score of 0  No follow-ups on file  Chief Complaint:     Chief Complaint   Patient presents with   • Annual Exam   • Carpal Tunnel     Patient has been getting treated by ortho for this  Is going to get eventually surgery  Is looking for medication to help the pain so he can sleep        History of Present Illness:     Adult Annual Physical   Patient here for a comprehensive physical exam  The patient reports  - see problem-focused note  Diet and Physical Activity  · Diet/Nutrition: poor diet and frequent junk food  · Exercise: no formal exercise  Depression Screening  PHQ-2/9 Depression Screening    Little interest or pleasure in doing things: 0 - not at all  Feeling down, depressed, or hopeless: 0 - not at all  PHQ-2 Score: 0  PHQ-2 Interpretation: Negative depression screen       General Health  · Sleep: sleeps poorly  · Hearing: decreased - bilateral    · Vision: goes for regular eye exams  · Dental: no dental visits for >1 year   Health  · Symptoms include: none     Review of Systems:     Review of Systems  - see problem-focused note  Past Medical History:     Past Medical History:   Diagnosis Date   • High cholesterol       Past Surgical History:     Past Surgical History:   Procedure Laterality Date   • APPENDECTOMY        Family History:     No family history on file     Social History:     Social History     Socioeconomic History   • Marital status: Single     Spouse name: None   • Number of children: None   • Years of education: None   • Highest education level: None   Occupational History   • None   Tobacco Use   • Smoking status: Never   • Smokeless tobacco: Never   Vaping Use   • Vaping Use: Never used   Substance and Sexual Activity   • Alcohol use: None   • Drug use: None   • Sexual activity: None   Other Topics Concern   • None   Social History Narrative   • None     Social Determinants of Health     Financial Resource Strain: Not on file   Food Insecurity: Not on file   Transportation Needs: Not on file   Physical Activity: Not on file   Stress: Not on file   Social Connections: Not on file   Intimate Partner Violence: Not on file   Housing Stability: Not on file      Current Medications:     Current Outpatient Medications   Medication Sig Dispense Refill   • albuterol (ProAir HFA) 90 mcg/act inhaler Inhale 2 puffs every 6 (six) hours as needed for wheezing 8 5 g 5   • cetirizine (ZyrTEC) 10 mg tablet Take 10 mg by mouth daily     • gabapentin (Neurontin) 100 mg capsule Take 1 capsule (100 mg total) by mouth daily at bedtime 90 capsule 1   • lidocaine (LIDODERM) 5 % APPLY 1 PATCH TOPICALLY UP TO 12 HOURS WITHIN A 24 HOUR PERIOD     • triamcinolone (KENALOG) 0 1 % cream Apply topically 2 (two) times a day 30 g 0     No current facility-administered medications for this visit  Allergies:     No Known Allergies   Physical Exam:     /80   Pulse 88   Temp 98 1 °F (36 7 °C)   Ht 5' 3" (1 6 m)   Wt 88 5 kg (195 lb)   SpO2 98%   BMI 34 54 kg/m²     Physical Exam - see problem-focused note       [address] [facility]IMARY [facility]

## 2022-11-23 NOTE — LETTER
Procedure Request Form: Colonoscopy      Date Requested: 22  Patient: Nickola Bullocks  Patient : 1970   Referring Provider: Brie Reddy, DO        Date of Procedure ______________________________       The above patient has informed us that they have completed their   most recent Colonoscopy at your facility  Please complete   this form and attach all corresponding procedure reports/results  Comments __________________________________________________________  ____________________________________________________________________  ____________________________________________________________________  ____________________________________________________________________    Facility Completing Procedure _________________________________________    Form Completed By (print name) _______________________________________      Signature __________________________________________________________      These reports are needed for  compliance  Please fax this completed form and a copy of the procedure report to our office located at [place] [address] as soon as possible to [phone] stacey Sanchez: Phone [phone]    We thank you for your assistance in treating our mutual patient

## 2022-11-23 NOTE — TELEPHONE ENCOUNTER
----- Message from Mehran Dalal sent at 11/23/2022  9:59 AM EST -----  Regarding: Colonoscopy  11/23/22 9:59 AM    Hello, our patient Cat Messina has had CRC: Colonoscopy completed/performed  Please assist in updating the patient chart by making an External outreach to [facility] located in [facility]      Thank you,  Mehran Garcia

## 2022-11-30 NOTE — TELEPHONE ENCOUNTER
As a follow-up, a second attempt has been made for outreach via telephone call to facility  Please see Contacts section for details      Thank you  Charis Zuniga MA

## 2022-12-01 NOTE — TELEPHONE ENCOUNTER
12/01/22 10:44 AM     [employer] SmartForm used to document caregap status      Donald Altamirano MA

## 2022-12-01 NOTE — TELEPHONE ENCOUNTER
Upon review of the In Basket request we were able to locate, review, and update the patient chart as requested for CRC: Colonoscopy  Any additional questions or concerns should be emailed to the Practice Liaisons via the appropriate education email address, please do not reply via In Basket      Thank you  Yoly Price MA

## 2022-12-02 ENCOUNTER — OFFICE VISIT (OUTPATIENT)
Dept: AUDIOLOGY | Facility: CLINIC | Age: 52
End: 2022-12-02

## 2022-12-02 DIAGNOSIS — H90.5 SENSORY HEARING LOSS, UNILATERAL: ICD-10-CM

## 2022-12-02 DIAGNOSIS — H91.93 DECREASED HEARING OF BOTH EARS: Primary | ICD-10-CM

## 2022-12-02 NOTE — PROGRESS NOTES
HEARING EVALUATION    Name:  Rosalie Gifford  :  1970  Age:  45 y o  Date of Evaluation: 22     History: Difficulty Understanding  Reason for visit: Rosalie Gifford is being seen today at the request of Dr Cyn Maria for an evaluation of hearing  Patient reports some trouble hearing at times  No tinnitus, aural fullness, or ear pain  No recent colds or ear infections  There is a history of loud noise exposure  EVALUATION:    Otoscopic Evaluation:   Right Ear: Clear and healthy ear canal and tympanic membrane   Left Ear: Clear and healthy ear canal and tympanic membrane    Tympanometry:   Right: Type A - normal middle ear pressure and compliance   Left: Type A - normal middle ear pressure and compliance    Audiogram Results:  Pure tone testing revealed a mild sloping to moderate high frequency sensorineural noise notch in the left ear and normal hearing sensitivity in the right ear  SRT and PTA are in agreement indicating good test reliability  Word recognition scores were excellent bilaterally  *see attached audiogram      RECOMMENDATIONS:  Annual hearing eval, Return to [facility]  for F/U, Hearing Protection and Copy to Patient/Caregiver    PATIENT EDUCATION:   Discussed results and recommendations with Neil  Questions were addressed and the patient was encouraged to contact our department should concerns arise        Helena Powell   Clinical Audiologist

## 2022-12-27 ENCOUNTER — OFFICE VISIT (OUTPATIENT)
Dept: URGENT CARE | Facility: CLINIC | Age: 52
End: 2022-12-27

## 2022-12-27 VITALS
HEART RATE: 60 BPM | BODY MASS INDEX: 34.55 KG/M2 | DIASTOLIC BLOOD PRESSURE: 90 MMHG | OXYGEN SATURATION: 98 % | SYSTOLIC BLOOD PRESSURE: 139 MMHG | RESPIRATION RATE: 16 BRPM | HEIGHT: 63 IN | WEIGHT: 195 LBS | TEMPERATURE: 98.4 F

## 2022-12-27 DIAGNOSIS — R09.81 NASAL CONGESTION: Primary | ICD-10-CM

## 2022-12-27 LAB
SARS-COV-2 AG UPPER RESP QL IA: NEGATIVE
VALID CONTROL: NORMAL

## 2022-12-27 NOTE — PATIENT INSTRUCTIONS
Recommend otc mucinex  Rest and drink plenty of fluids  A cool mist humidifier and saline rinse can be helpful  If you develop a worsening cough, chest pain, shortness of breath, palpitations, coughing up blood, prolonged high fever, severe headache, dizziness, any new or concerning symptoms please return or proceed ER    Recommend following up with PCP in 3-5 days

## 2022-12-27 NOTE — LETTER
December 27, 2022     Patient: Gina Stern   YOB: 1970   Date of Visit: 12/27/2022       To Whom it May Concern:    Paula Rape was seen in my clinic on 12/27/2022  He may return to work on 12/28/2022  If you have any questions or concerns, please don't hesitate to call           Sincerely,          MANSI Dominique        CC: No Recipients

## 2022-12-27 NOTE — PROGRESS NOTES
[address] [facility]        NAME: Heriberto Martinez is a 45 y o  male  : 1970    MRN: [id number]  DATE: 2022  TIME: 8:58 AM    Assessment and Plan   Nasal congestion [R09 81]  1  Nasal congestion  Poct Covid 19 Rapid Antigen Test        Rapid covid negative    Patient Instructions     Patient Instructions   Recommend otc mucinex  Rest and drink plenty of fluids  A cool mist humidifier and saline rinse can be helpful  If you develop a worsening cough, chest pain, shortness of breath, palpitations, coughing up blood, prolonged high fever, severe headache, dizziness, any new or concerning symptoms please return or proceed ER  Recommend following up with PCP in 3-5 days        Follow up with PCP in 3-5 days  Proceed to  ER if symptoms worsen  Chief Complaint     Chief Complaint   Patient presents with   • Nasal Congestion     Congestion, started Saturday work is making him get checked out  History of Present Illness       URI   This is a new problem  Episode onset: 2 days ago  The problem has been unchanged  There has been no fever  Associated symptoms include congestion and rhinorrhea  Pertinent negatives include no abdominal pain, chest pain, coughing, diarrhea, ear pain, headaches, joint pain, joint swelling, nausea, neck pain, rash, sinus pain, sore throat, swollen glands, vomiting or wheezing  Treatments tried: zyrtec  The treatment provided mild relief  Review of Systems   Review of Systems   Constitutional: Negative for chills, diaphoresis, fatigue and fever  HENT: Positive for congestion, rhinorrhea and sinus pressure  Negative for ear pain, facial swelling, mouth sores, postnasal drip, sinus pain, sore throat and trouble swallowing  Eyes: Negative for photophobia and visual disturbance  Respiratory: Negative for cough, chest tightness, shortness of breath and wheezing  Cardiovascular: Negative for chest pain and palpitations     Gastrointestinal: Negative for abdominal pain, diarrhea, nausea and vomiting  Genitourinary: Negative  Musculoskeletal: Negative for arthralgias, back pain, joint pain, joint swelling, myalgias, neck pain and neck stiffness  Skin: Negative for rash  Neurological: Negative for dizziness, facial asymmetry, weakness, light-headedness, numbness and headaches  Current Medications       Current Outpatient Medications:   •  albuterol (ProAir HFA) 90 mcg/act inhaler, Inhale 2 puffs every 6 (six) hours as needed for wheezing, Disp: 8 5 g, Rfl: 5  •  cetirizine (ZyrTEC) 10 mg tablet, Take 10 mg by mouth daily, Disp: , Rfl:   •  gabapentin (Neurontin) 100 mg capsule, Take 1 capsule (100 mg total) by mouth daily at bedtime, Disp: 90 capsule, Rfl: 1  •  lidocaine (LIDODERM) 5 %, APPLY 1 PATCH TOPICALLY UP TO 12 HOURS WITHIN A 24 HOUR PERIOD, Disp: , Rfl:   •  triamcinolone (KENALOG) 0 1 % cream, Apply topically 2 (two) times a day, Disp: 30 g, Rfl: 0    Current Allergies     Allergies as of 12/27/2022   • (No Known Allergies)            The following portions of the patient's history were reviewed and updated as appropriate: allergies, current medications, past family history, past medical history, past social history, past surgical history and problem list      Past Medical History:   Diagnosis Date   • High cholesterol        Past Surgical History:   Procedure Laterality Date   • APPENDECTOMY         History reviewed  No pertinent family history  Medications have been verified  Objective   /90   Pulse 60   Temp 98 4 °F (36 9 °C)   Resp 16   Ht 5' 3" (1 6 m)   Wt 88 5 kg (195 lb)   SpO2 98%   BMI 34 54 kg/m²   No LMP for male patient  Physical Exam     Physical Exam  Constitutional:       General: He is not in acute distress  Appearance: He is well-developed  HENT:      Head: Normocephalic and atraumatic        Right Ear: Hearing, tympanic membrane, ear canal and external ear normal       Left Ear: Hearing, tympanic membrane, ear canal and external ear normal       Nose: Congestion and rhinorrhea present  No mucosal edema  Right Sinus: No maxillary sinus tenderness or frontal sinus tenderness  Left Sinus: No maxillary sinus tenderness or frontal sinus tenderness  Mouth/Throat:      Mouth: Mucous membranes are moist       Pharynx: Oropharynx is clear  Uvula midline  No oropharyngeal exudate or posterior oropharyngeal erythema  Tonsils: No tonsillar exudate or tonsillar abscesses  1+ on the right  1+ on the left  Cardiovascular:      Rate and Rhythm: Normal rate and regular rhythm  Heart sounds: Normal heart sounds, S1 normal and S2 normal    Pulmonary:      Effort: Pulmonary effort is normal       Breath sounds: Normal breath sounds and air entry  Lymphadenopathy:      Cervical: No cervical adenopathy  Skin:     General: Skin is warm and dry  Capillary Refill: Capillary refill takes less than 2 seconds  Neurological:      Mental Status: He is alert and oriented to person, place, and time

## 2023-02-13 ENCOUNTER — CLINICAL SUPPORT (OUTPATIENT)
Dept: FAMILY MEDICINE CLINIC | Facility: CLINIC | Age: 53
End: 2023-02-13

## 2023-02-13 DIAGNOSIS — Z23 ENCOUNTER FOR IMMUNIZATION: Primary | ICD-10-CM

## 2023-03-03 ENCOUNTER — TELEPHONE (OUTPATIENT)
Dept: ADMINISTRATIVE | Facility: OTHER | Age: 53
End: 2023-03-03

## 2023-03-03 NOTE — TELEPHONE ENCOUNTER
----- Message from Maryam Duenas sent at 3/2/2023  1:24 PM EST -----  Regarding: Marcello  03/02/23 1:25 PM    Hello, our patient Nereyda Matos has had CRC: Marcello completed/performed  Please assist in updating the patient chart by pulling the Care Everywhere (CE) document  The date of service is 9/27/2022       Thank you,  Naomy Garcia

## 2023-03-03 NOTE — TELEPHONE ENCOUNTER
Upon review of the In Basket request we were able to locate, review, and update the patient chart as requested for CRC: Cologuard  Any additional questions or concerns should be emailed to the Practice Liaisons via the appropriate education email address, please do not reply via In Basket      Thank you  Mark Glez MA

## 2023-03-29 ENCOUNTER — OFFICE VISIT (OUTPATIENT)
Dept: FAMILY MEDICINE CLINIC | Facility: CLINIC | Age: 53
End: 2023-03-29

## 2023-03-29 VITALS
HEIGHT: 63 IN | SYSTOLIC BLOOD PRESSURE: 110 MMHG | HEART RATE: 68 BPM | WEIGHT: 192 LBS | TEMPERATURE: 98.8 F | OXYGEN SATURATION: 98 % | BODY MASS INDEX: 34.02 KG/M2 | DIASTOLIC BLOOD PRESSURE: 66 MMHG | RESPIRATION RATE: 16 BRPM

## 2023-03-29 DIAGNOSIS — R06.83 SNORING: Primary | ICD-10-CM

## 2023-03-29 NOTE — PROGRESS NOTES
"Assessment/Plan:       Problem List Items Addressed This Visit    None  Visit Diagnoses     Snoring    -  Primary    Relevant Orders    Ambulatory Referral to Sleep Medicine    BMI 34 0-34 9,adult        Relevant Orders    Ambulatory Referral to Weight Management            Referrals to sleep medicine and weight management  Recommended to complete blood work as ordered  Follow-up in 4-6 weeks  Subjective:      Patient ID: Bolivar Bush is a 52 y o  male  HPI     Snoring- His wife notices snoring loudly, wife witnesses stop breathing in sleep  This has been with higher weight  BMI 34- Weight has slowly increased, admits to poor diet, snacking and eating more carbs/sweets at family parties  With this, noticed back pain more frequently, snoring as above  Would like to meet with the bariatrics team to discuss weight loss options  The following portions of the patient's history were reviewed and updated as appropriate: allergies, current medications, past family history, past medical history, past social history, past surgical history, and problem list     Review of Systems   All other systems reviewed and are negative  Objective:      /66   Pulse 68   Temp 98 8 °F (37 1 °C) (Tympanic)   Resp 16   Ht 5' 3\" (1 6 m)   Wt 87 1 kg (192 lb)   SpO2 98%   BMI 34 01 kg/m²          Physical Exam  Vitals reviewed  Constitutional:       General: He is not in acute distress  Appearance: Normal appearance  He is not ill-appearing, toxic-appearing or diaphoretic  Pulmonary:      Effort: Pulmonary effort is normal  No respiratory distress  Neurological:      Mental Status: He is alert and oriented to person, place, and time     Psychiatric:         Mood and Affect: Mood normal          Behavior: Behavior normal              DO Candida Farias [address] [place] [address] Primary Care     "

## 2023-05-05 ENCOUNTER — TELEPHONE (OUTPATIENT)
Dept: SLEEP CENTER | Facility: CLINIC | Age: 53
End: 2023-05-05

## 2023-05-05 NOTE — TELEPHONE ENCOUNTER
Received call from patient's spouse Lennox Shone (communication consent in media)  Tracy Bhakta requesting consultation appointment be added to wait list for sooner date with any provider      Appointment added to cancellation list

## 2023-05-13 ENCOUNTER — APPOINTMENT (EMERGENCY)
Dept: CT IMAGING | Facility: HOSPITAL | Age: 53
End: 2023-05-13

## 2023-05-13 ENCOUNTER — HOSPITAL ENCOUNTER (EMERGENCY)
Facility: HOSPITAL | Age: 53
Discharge: HOME | End: 2023-05-14
Attending: EMERGENCY MEDICINE

## 2023-05-13 DIAGNOSIS — R91.8 PULMONARY NODULES: ICD-10-CM

## 2023-05-13 DIAGNOSIS — R07.81 RIB PAIN ON LEFT SIDE: ICD-10-CM

## 2023-05-13 DIAGNOSIS — S20.219A RIB CONTUSION: Primary | ICD-10-CM

## 2023-05-13 LAB
ALBUMIN SERPL BCP-MCNC: 4.5 G/DL (ref 3.5–5)
ALP SERPL-CCNC: 78 U/L (ref 34–104)
ALT SERPL W P-5'-P-CCNC: 48 U/L (ref 7–52)
ANION GAP SERPL CALCULATED.3IONS-SCNC: 10 MMOL/L (ref 4–13)
APTT PPP: 26 SECONDS (ref 23–37)
AST SERPL W P-5'-P-CCNC: 30 U/L (ref 13–39)
BASOPHILS # BLD AUTO: 0.09 THOUSANDS/ÂΜL (ref 0–0.1)
BASOPHILS NFR BLD AUTO: 1 % (ref 0–1)
BILIRUB SERPL-MCNC: 0.4 MG/DL (ref 0.2–1)
BUN SERPL-MCNC: 20 MG/DL (ref 5–25)
CALCIUM SERPL-MCNC: 9.3 MG/DL (ref 8.4–10.2)
CHLORIDE SERPL-SCNC: 104 MMOL/L (ref 96–108)
CO2 SERPL-SCNC: 21 MMOL/L (ref 21–32)
CREAT SERPL-MCNC: 0.84 MG/DL (ref 0.6–1.3)
EOSINOPHIL # BLD AUTO: 0.58 THOUSAND/ÂΜL (ref 0–0.61)
EOSINOPHIL NFR BLD AUTO: 6 % (ref 0–6)
ERYTHROCYTE [DISTWIDTH] IN BLOOD BY AUTOMATED COUNT: 11.8 % (ref 11.6–15.1)
GFR SERPL CREATININE-BSD FRML MDRD: 98 ML/MIN/1.73SQ M
GLUCOSE SERPL-MCNC: 95 MG/DL (ref 65–140)
HCT VFR BLD AUTO: 47 % (ref 36.5–49.3)
HGB BLD-MCNC: 16.1 G/DL (ref 12–17)
IMM GRANULOCYTES # BLD AUTO: 0.02 THOUSAND/UL (ref 0–0.2)
IMM GRANULOCYTES NFR BLD AUTO: 0 % (ref 0–2)
INR PPP: 0.94 (ref 0.84–1.19)
LYMPHOCYTES # BLD AUTO: 4.63 THOUSANDS/ÂΜL (ref 0.6–4.47)
LYMPHOCYTES NFR BLD AUTO: 45 % (ref 14–44)
MCH RBC QN AUTO: 31.4 PG (ref 26.8–34.3)
MCHC RBC AUTO-ENTMCNC: 34.3 G/DL (ref 31.4–37.4)
MCV RBC AUTO: 92 FL (ref 82–98)
MONOCYTES # BLD AUTO: 0.9 THOUSAND/ÂΜL (ref 0.17–1.22)
MONOCYTES NFR BLD AUTO: 9 % (ref 4–12)
NEUTROPHILS # BLD AUTO: 4.02 THOUSANDS/ÂΜL (ref 1.85–7.62)
NEUTS SEG NFR BLD AUTO: 39 % (ref 43–75)
NRBC BLD AUTO-RTO: 0 /100 WBCS
PLATELET # BLD AUTO: 228 THOUSANDS/UL (ref 149–390)
PMV BLD AUTO: 10.8 FL (ref 8.9–12.7)
POTASSIUM SERPL-SCNC: 3.8 MMOL/L (ref 3.5–5.3)
PROT SERPL-MCNC: 7.3 G/DL (ref 6.4–8.4)
PROTHROMBIN TIME: 12.6 SECONDS (ref 11.6–14.5)
RBC # BLD AUTO: 5.12 MILLION/UL (ref 3.88–5.62)
SODIUM SERPL-SCNC: 135 MMOL/L (ref 135–147)
WBC # BLD AUTO: 10.24 THOUSAND/UL (ref 4.31–10.16)

## 2023-05-13 RX ADMIN — IOHEXOL 100 ML: 350 INJECTION, SOLUTION INTRAVENOUS at 23:10

## 2023-05-14 VITALS
TEMPERATURE: 97.4 F | RESPIRATION RATE: 18 BRPM | OXYGEN SATURATION: 95 % | DIASTOLIC BLOOD PRESSURE: 84 MMHG | SYSTOLIC BLOOD PRESSURE: 131 MMHG | HEIGHT: 63 IN | BODY MASS INDEX: 34.02 KG/M2 | HEART RATE: 74 BPM | WEIGHT: 192 LBS

## 2023-05-14 LAB
ATRIAL RATE: 70 BPM
P AXIS: 38 DEGREES
PR INTERVAL: 130 MS
QRS AXIS: 27 DEGREES
QRSD INTERVAL: 88 MS
QT INTERVAL: 370 MS
QTC INTERVAL: 399 MS
T WAVE AXIS: 31 DEGREES
VENTRICULAR RATE: 70 BPM

## 2023-05-14 RX ORDER — KETOROLAC TROMETHAMINE 30 MG/ML
15 INJECTION, SOLUTION INTRAMUSCULAR; INTRAVENOUS ONCE
Status: COMPLETED | OUTPATIENT
Start: 2023-05-14 | End: 2023-05-14

## 2023-05-14 RX ORDER — LIDOCAINE 50 MG/G
1 PATCH TOPICAL DAILY
Qty: 7 PATCH | Refills: 0 | Status: SHIPPED | OUTPATIENT
Start: 2023-05-14

## 2023-05-14 RX ORDER — LIDOCAINE 50 MG/G
1 PATCH TOPICAL ONCE
Status: DISCONTINUED | OUTPATIENT
Start: 2023-05-14 | End: 2023-05-14 | Stop reason: HOSPADM

## 2023-05-14 RX ORDER — ACETAMINOPHEN 325 MG/1
650 TABLET ORAL ONCE
Status: COMPLETED | OUTPATIENT
Start: 2023-05-14 | End: 2023-05-14

## 2023-05-14 RX ORDER — MELOXICAM 15 MG/1
15 TABLET ORAL DAILY
Qty: 14 TABLET | Refills: 0 | Status: SHIPPED | OUTPATIENT
Start: 2023-05-14 | End: 2023-05-28

## 2023-05-14 RX ORDER — METHOCARBAMOL 500 MG/1
500 TABLET, FILM COATED ORAL 2 TIMES DAILY PRN
Qty: 20 TABLET | Refills: 0 | Status: SHIPPED | OUTPATIENT
Start: 2023-05-14

## 2023-05-14 RX ADMIN — ACETAMINOPHEN 650 MG: 325 TABLET ORAL at 01:13

## 2023-05-14 RX ADMIN — LIDOCAINE 1 PATCH: 50 PATCH CUTANEOUS at 01:12

## 2023-05-14 RX ADMIN — KETOROLAC TROMETHAMINE 15 MG: 30 INJECTION, SOLUTION INTRAMUSCULAR at 01:14

## 2023-05-14 NOTE — ED PROVIDER NOTES
History  Chief Complaint   Patient presents with   • Shortness of Breath   • Rib Pain     Pt presents to ER from home for reports of L posterior thoracic rib pain - slip and fall into a tree last week but had an acute onset of pain this evening  Patient reports shortness of breath  Presents in moderate distress, unable to sit still from pain  Some bruising and swelling on L flank  40-year-old male presents the emergency department for evaluation of left posterior rib and left flank pain  Patient states last week he did have a slip and fall where he struck his back on a tree, he did have immediate onset of pain that evening, but it is since improved  He presents tonight with progressively worsening pain that started this morning  The pain makes it hard for him to breathe  Pain is made worse with movement  He has not tried anything for his symptoms  He denies any headache, fever, chills, chest pain, or hemoptysis  No abdominal pain, nausea or vomiting  He is not on any blood thinners  Denies any midline pain  No numbness or tingling  Prior to Admission Medications   Prescriptions Last Dose Informant Patient Reported? Taking? albuterol (ProAir HFA) 90 mcg/act inhaler   No No   Sig: Inhale 2 puffs every 6 (six) hours as needed for wheezing   cetirizine (ZyrTEC) 10 mg tablet   Yes No   Sig: Take 10 mg by mouth daily      Facility-Administered Medications: None       Past Medical History:   Diagnosis Date   • High cholesterol        Past Surgical History:   Procedure Laterality Date   • APPENDECTOMY         History reviewed  No pertinent family history  I have reviewed and agree with the history as documented      E-Cigarette/Vaping   • E-Cigarette Use Never User      E-Cigarette/Vaping Substances     Social History     Tobacco Use   • Smoking status: Never   • Smokeless tobacco: Never   Vaping Use   • Vaping Use: Never used   Substance Use Topics   • Alcohol use: Yes     Comment: socially   • Drug use: Never       Review of Systems   Constitutional: Negative for chills and fever  HENT: Negative for ear pain and sore throat  Eyes: Negative for pain and visual disturbance  Respiratory: Positive for shortness of breath  Negative for cough  Cardiovascular: Negative for chest pain and palpitations  Rib pain   Gastrointestinal: Negative for abdominal pain, constipation, diarrhea, nausea and vomiting  Genitourinary: Negative for dysuria and hematuria  Musculoskeletal: Negative for arthralgias, back pain, neck pain and neck stiffness  Skin: Negative for rash  Neurological: Negative for seizures and syncope  All other systems reviewed and are negative  Physical Exam  Physical Exam  Vitals and nursing note reviewed  Constitutional:       General: He is in acute distress (from pain)  HENT:      Head: Normocephalic and atraumatic  Right Ear: External ear normal       Left Ear: External ear normal       Nose: Nose normal       Mouth/Throat:      Mouth: Mucous membranes are moist       Pharynx: Oropharynx is clear  Eyes:      Extraocular Movements: Extraocular movements intact  Pupils: Pupils are equal, round, and reactive to light  Cardiovascular:      Rate and Rhythm: Normal rate and regular rhythm  Pulses: Normal pulses  Heart sounds: No murmur heard  Comments: Left posterior rib tenderness, no deformity or crepitus  Pulmonary:      Effort: No respiratory distress  Breath sounds: Normal breath sounds  No stridor  No wheezing  Comments: tachypnea  Abdominal:      General: Abdomen is flat  Bowel sounds are normal       Tenderness: There is no abdominal tenderness  There is no guarding or rebound  Musculoskeletal:         General: No deformity  Normal range of motion  Cervical back: Normal range of motion and neck supple        Comments: No midline neck or back tenderness, no step-offs or deformities   Skin:     General: Skin is warm and dry       Capillary Refill: Capillary refill takes less than 2 seconds  Findings: Bruising (left flank) present  No rash  Neurological:      General: No focal deficit present  Mental Status: He is alert and oriented to person, place, and time  Sensory: No sensory deficit     Psychiatric:         Mood and Affect: Mood normal          Behavior: Behavior normal          Vital Signs  ED Triage Vitals [05/13/23 2202]   Temperature Pulse Respirations Blood Pressure SpO2   (!) 97 4 °F (36 3 °C) 74 (!) 24 131/84 95 %      Temp Source Heart Rate Source Patient Position - Orthostatic VS BP Location FiO2 (%)   Tympanic Monitor Lying Left arm --      Pain Score       10 - Worst Possible Pain           Vitals:    05/13/23 2202   BP: 131/84   Pulse: 74   Patient Position - Orthostatic VS: Lying         Visual Acuity      ED Medications  Medications   iohexol (OMNIPAQUE) 350 MG/ML injection (SINGLE-DOSE) 100 mL (100 mL Intravenous Given 5/13/23 2310)   ketorolac (TORADOL) injection 15 mg (15 mg Intravenous Given 5/14/23 0114)   acetaminophen (TYLENOL) tablet 650 mg (650 mg Oral Given 5/14/23 0113)       Diagnostic Studies  Results Reviewed     Procedure Component Value Units Date/Time    Comprehensive metabolic panel [[id number]] Collected: 05/13/23 2224    Lab Status: Final result Specimen: Blood from Arm, Right Updated: 05/13/23 2254     Sodium 135 mmol/L      Potassium 3 8 mmol/L      Chloride 104 mmol/L      CO2 21 mmol/L      ANION GAP 10 mmol/L      BUN 20 mg/dL      Creatinine 0 84 mg/dL      Glucose 95 mg/dL      Calcium 9 3 mg/dL      AST 30 U/L      ALT 48 U/L      Alkaline Phosphatase 78 U/L      Total Protein 7 3 g/dL      Albumin 4 5 g/dL      Total Bilirubin 0 40 mg/dL      eGFR 98 ml/min/1 73sq m     Narrative:      [facility] guidelines for Chronic Kidney Disease (CKD):   •  Stage 1 with normal or high GFR (GFR > 90 mL/min/1 73 square meters)  •  Stage 2 Mild CKD (GFR = 60-89 mL/min/1 73 square meters)  •  Stage 3A Moderate CKD (GFR = 45-59 mL/min/1 73 square meters)  •  Stage 3B Moderate CKD (GFR = 30-44 mL/min/1 73 square meters)  •  Stage 4 Severe CKD (GFR = 15-29 mL/min/1 73 square meters)  •  Stage 5 End Stage CKD (GFR <15 mL/min/1 73 square meters)  Note: GFR calculation is accurate only with a steady state creatinine    Protime-INR [[id number]]  (Normal) Collected: 05/13/23 2224    Lab Status: Final result Specimen: Blood from Arm, Right Updated: 05/13/23 2246     Protime 12 6 seconds      INR 0 94    APTT [[id number]]  (Normal) Collected: 05/13/23 2224    Lab Status: Final result Specimen: Blood from Arm, Right Updated: 05/13/23 2246     PTT 26 seconds     CBC and differential [[id number]]  (Abnormal) Collected: 05/13/23 2224    Lab Status: Final result Specimen: Blood from Arm, Right Updated: 05/13/23 2233     WBC 10 24 Thousand/uL      RBC 5 12 Million/uL      Hemoglobin 16 1 g/dL      Hematocrit 47 0 %      MCV 92 fL      MCH 31 4 pg      MCHC 34 3 g/dL      RDW 11 8 %      MPV 10 8 fL      Platelets 976 Thousands/uL      nRBC 0 /100 WBCs      Neutrophils Relative 39 %      Immat GRANS % 0 %      Lymphocytes Relative 45 %      Monocytes Relative 9 %      Eosinophils Relative 6 %      Basophils Relative 1 %      Neutrophils Absolute 4 02 Thousands/µL      Immature Grans Absolute 0 02 Thousand/uL      Lymphocytes Absolute 4 63 Thousands/µL      Monocytes Absolute 0 90 Thousand/µL      Eosinophils Absolute 0 58 Thousand/µL      Basophils Absolute 0 09 Thousands/µL                  CT chest abdomen pelvis w contrast   Final Result by Brii Rasmussen DO (05/14 0023)      No acute traumatic injury identified  Small pulmonary nodules  Recommend 12-month follow-up noncontrast CT of the chest          The study was marked in EPIC for immediate notification        Workstation performed: [id number]                    Procedures  Procedures         ED Course  ED Course as of 05/14/23 0518 Sat May 13, 2023   2208 EKG interpreted by myself demonstrates normal sinus rhythm with a rate of 70, normal axis, normal intervals, normal ST segment and T waves   Sun May 14, 2023   0107 Patient informed of CT findings, he is okay with pain meds at this time  SBIRT 20yo+    Flowsheet Row Most Recent Value   Initial Alcohol Screen: US AUDIT-C     1  How often do you have a drink containing alcohol? 1 Filed at: 05/13/2023 2205   2  How many drinks containing alcohol do you have on a typical day you are drinking? 1 Filed at: 05/13/2023 2205   3a  Male UNDER 65: How often do you have five or more drinks on one occasion? 0 Filed at: 05/13/2023 2205   Audit-C Score 2 Filed at: 05/13/2023 2205   JODI: How many times in the past year have you    Used an illegal drug or used a prescription medication for non-medical reasons? Never Filed at: 05/13/2023 2205                    Medical Decision Making  41-year-old male presents emergency department for evaluation of left posterior rib/flank pain and shortness of breath  Exam he is in mild distress from pain  Has tenderness to palpation of the left posterior ribs  Differential diagnosis includes but is not limited to rib fracture, contusion, pneumothorax, intra-abdominal hemorrhage  We will check basic labs, CT scan of the chest, abdomen, and pelvis  Patient does not want any medications for pain at this time  E fast was negative  Labs grossly unremarkable  CT scan negative for any acute findings but did demonstrate small pulmonary nodules  Patient was informed of these findings and need to follow-up with primary care physician to schedule outpatient follow-up testing in 12 months  Pain is improved following medications  He will be discharged home with prescriptions for Mobic, Robaxin, and Lidoderm patches  He was given strict return precautions      Pulmonary nodules: acute illness or injury  Rib contusion: acute illness or injury  Rib pain on left side: acute illness or injury  Amount and/or Complexity of Data Reviewed  Labs: ordered  Radiology: ordered and independent interpretation performed  ECG/medicine tests: ordered and independent interpretation performed  Risk  OTC drugs  Prescription drug management  Disposition  Final diagnoses:   Rib contusion   Rib pain on left side   Pulmonary nodules     Time reflects when diagnosis was documented in both MDM as applicable and the Disposition within this note     Time User Action Codes Description Comment    5/14/2023  1:00 AM Check, Karol Fuse Add Petrina Kehr Rib contusion     5/14/2023  1:00 AM Check, [place] Fuse Add [R07 81] Rib pain on left side     5/14/2023  1:01 AM Check, Karol Fuse Add [R91 8] Pulmonary nodules       ED Disposition     ED Disposition   Discharge    Condition   Stable    Date/Time   Sun May 14, 2023  1:18 AM    Comment   Maile Teran discharge to home/self care                 Follow-up Information     Follow up With Specialties Details Why [address] Emergency Department Emergency Medicine  If symptoms worsen [address]  [place] [id number] [phone] [facility], [address] [facility] [address], [address] [facility]          Discharge Medication List as of 5/14/2023  1:18 AM      START taking these medications    Details   lidocaine (Lidoderm) 5 % Apply 1 patch topically over 12 hours daily Remove & Discard patch within 12 hours or as directed by MD, Starting Sun 5/14/2023, Normal      meloxicam (Mobic) 15 mg tablet Take 1 tablet (15 mg total) by mouth daily for 14 days, Starting Sun 5/14/2023, Until Sun 5/28/2023, Normal      methocarbamol (ROBAXIN) 500 mg tablet Take 1 tablet (500 mg total) by mouth 2 (two) times a day as needed for muscle spasms, Starting Sun 5/14/2023, Normal         CONTINUE these medications which have NOT CHANGED    Details albuterol (ProAir HFA) 90 mcg/act inhaler Inhale 2 puffs every 6 (six) hours as needed for wheezing, Starting Tue 12/28/2021, Normal      cetirizine (ZyrTEC) 10 mg tablet Take 10 mg by mouth daily, Starting Fri 9/9/2022, Historical Med             No discharge procedures on file      PDMP Review     None          ED Provider  Electronically Signed by           Glenys Gustafson MD  05/14/23 8642

## 2023-05-14 NOTE — DISCHARGE INSTRUCTIONS
Small pulmonary nodules were seen on your CT scan today recommend repeat CT scan in 12 months  Follow-up with your primary care physician to discuss follow-up imaging      Take Mobic as prescribed, 1 tablet daily  You may also take Tylenol every 6 hours as needed for additional pain relief  Apply Lidoderm patches daily  Take robaxin as needed for muscle spasm    Return for any worsening symptoms including worsening pain, difficulty breathing, or any other concerning symptoms

## 2023-07-27 ENCOUNTER — APPOINTMENT (OUTPATIENT)
Dept: PHYSICAL THERAPY | Facility: CLINIC | Age: 53
End: 2023-07-27

## 2023-07-27 PROCEDURE — 97530 THERAPEUTIC ACTIVITIES: CPT | Performed by: PHYSICAL MEDICINE & REHABILITATION

## 2023-08-02 ENCOUNTER — EVALUATION (OUTPATIENT)
Dept: OCCUPATIONAL THERAPY | Facility: CLINIC | Age: 53
End: 2023-08-02

## 2023-08-02 DIAGNOSIS — Z98.890 S/P CARPAL TUNNEL RELEASE: Primary | ICD-10-CM

## 2023-08-02 PROCEDURE — 97140 MANUAL THERAPY 1/> REGIONS: CPT

## 2023-08-02 PROCEDURE — 97018 PARAFFIN BATH THERAPY: CPT

## 2023-08-02 PROCEDURE — 97535 SELF CARE MNGMENT TRAINING: CPT

## 2023-08-02 PROCEDURE — 97166 OT EVAL MOD COMPLEX 45 MIN: CPT

## 2023-08-02 NOTE — PROGRESS NOTES
OT Evaluation     Today's date: 8/3/2023  Patient name: Zora Calderon  : 1970  MRN: [id number]  Referring provider: Corine Figueredo  Dx:   Encounter Diagnosis     ICD-10-CM    1. S/P carpal tunnel release  Z98.890                      Assessment  Assessment details: Patient presenting with OP OT services with a diagnosis of CTR. Patient had surgery completed on 07/10/2023 by Dr. Nadege Sharpe. Patient had injections completed prior for conservative management. Patient had EMG completed prior to surgery. Patient reports working as a . Patient's next follow-up appointment is at the end of this month. Patient is right handed. Patient reports symptoms were equal in hands prior to surgery. Patient reports not working since surgery. Patient had sutures removed in right hand and thumb several weeks. He removed his left sutures a week later. Impairments: abnormal coordination, abnormal or restricted ROM and impaired physical strength    Symptom irritability: moderateUnderstanding of Dx/Px/POC: good   Prognosis: good    Goals  STGs    Pt will increase  strength by 5-10#. Pt will increase wrist strength by 1/2 grade. Pt will increase wrist ROM by 50%     Pt will demonstrate decrease in edema by 25%    Pt will report a decrease in sensation deficits by 25%    Independent with HEP      LTGs     Pt will increase  strength by an additional 5-10#. Pt will increase wrist strength by 1-2 grade. Pt will increase wrist ROM to [facility]. Pt will demonstrate decrease in edema by 50%    Pt will increase pinch strength by 3-5#. Pt will report an increase in ADL/IADL participation    Pt will report a decrease in sensation deficits by 50%      Plan  Plan details: Patient has presenting to OP OT services with a dx of CTR. Patient demonstrating increased pain, decreased strength, decreased ROM and decreased activity.  Pt would benefit from continued Occupational Therapy services one time per week for 2 weeks to return to prior level of function and achieve all established goals. Thank you for the referral!    Patient would benefit from: OT eval, skilled occupational therapy and custom splinting  Referral necessary: Yes  Planned modality interventions: ultrasound, unattended electrical stimulation, thermotherapy: hydrocollator packs, cryotherapy and thermotherapy: paraffin bath  Planned therapy interventions: massage, manual therapy, joint mobilization, patient education, strengthening, stretching, fine motor coordination training, home exercise program, neuromuscular re-education, self care, therapeutic exercise and therapeutic activities  Frequency: 2x week  Duration in visits: 8  Duration in weeks: 4  Treatment plan discussed with: patient        Subjective Evaluation    History of Present Illness  Mechanism of injury: B/L CTR, L TFR          Not a recurrent problem   Quality of life: good    Patient Goals  Patient goals for therapy: decreased edema, decreased pain, increased motion, increased strength and independence with ADLs/IADLs    Pain  Current pain ratin  At best pain ratin  At worst pain ratin  Location: B/L Wrists    Social Support    Employment status: working  Hand dominance: right      Diagnostic Tests  EMG: abnormal  Treatments  Current treatment: occupational therapy        Objective     Observations     Left Wrist/Hand   Positive for incision. Right Wrist/Hand   Positive for incision. Additional Observation Details  Patient presenting with bilateral wrists incision along volar wrist measuring in 2.5 cm with routine healing.  Patient also has a 2.5 cm in right IP with routine healing noted    Neurological Testing     Sensation     Wrist/Hand   Left   Diminished: light touch    Right   Diminished: light touch    Active Range of Motion     Left Wrist   Wrist flexion: 50 degrees   Wrist extension: 50 degrees   Radial deviation: 15 degrees   Ulnar deviation: 30 degrees     Right Wrist   Wrist flexion: 50 degrees   Wrist extension: 45 degrees   Radial deviation: 15 degrees   Ulnar deviation: 35 degrees     Left Thumb     Opposition: Opposition all but SF    Right Thumb   Flexion     MP: 40    DIP: 45  Opposition: Opposition all but SF    Additional Active Range of Motion Details  Decreased wrist strength bilaterally  Soft composite fist noted    Strength/Myotome Testing     Left Wrist/Hand   Wrist extension: 3-  Wrist flexion: 3-  Radial deviation: 3+  Ulnar deviation: 3+     (2nd hand position)     Trial 1: 23    Thumb Strength  Key/Lateral Pinch     Trial 1: 5    Right Wrist/Hand   Wrist extension: 3-  Wrist flexion: 3-  Radial deviation: 3+  Ulnar deviation: 3+     (2nd hand position)     Trial 1: 15    Thumb Strength   Key/Lateral Pinch     Trial 1: 4    Additional Strength Details  Patient demonstrating with a decrease in  and pinch strength bilaterally    Swelling     Left Wrist/Hand   Circumference MCP: 22 cm  Circumference wrist: 18.3 cm    Additional Swelling Details  Increased swelling bilaterally as per patient    Right Wrist/Hand   Circumference MCP: 21.9 cm  Circumference wrist: 18.3 cm    Additional Swelling Details  Increased swelling bilaterally as per patient  Neuro Exam:     Functional outcomes   Left 9 peg hole test: 22.24 (seconds)  Right 9 hole peg test: 18.54 (seconds)    Patient tolerated session well. Patient and therapist discussed exercises as per initial HEP. Patient verbalized understanding of education and demonstrated proper technique. Therapist will make increases as tolerated.  Continue with POC              Precautions s/p B/L CTR  Initial Evaluation completed on: 08/02/2023  Re-Evaluation needs to be completed before: 09/02/2023  Insurance: Tiffanie Wisdom  FOTO: 08/02/2023  Auth Visits: N/A          Manuals 08/02/2023       Scar Massage IASTM  GT #3  GT#4  10' B/L       Manual Massage        PROM Wrist All Planes 30 sec x 3       Edema Massage Cupping                Neuro Re-Ed  08/02/2023                                                               Ther Ex 08/02/2023       TGE        Median Nerve Glide HEP       Prayer Stretch        Wrist AROM   Flex/Ext  RD/UD HEP       Digi-Flex        Wrist Maze        Hand Power Pro                Ther Activity 08/02/2023       Tension Pin w/ [place]                                        Modalities 08/02/2023       Ultrasound        MH        Paraffin Performed at end of session  10'

## 2023-08-02 NOTE — LETTER
August 3, 2023    Bo Askew  [address] [place] [address]  [facility]    Patient: Priya Bryant   YOB: 1970   Date of Visit: 2023     Encounter Diagnosis     ICD-10-CM    1. S/P carpal tunnel release  Z98.890           Dear Dr. Mode Jensen: Thank you for your recent referral of Priya Bryant. Please review the attached evaluation summary from Tanvir's recent visit. Please verify that you agree with the plan of care by signing the attached order. If you have any questions or concerns, please do not hesitate to call. I sincerely appreciate the opportunity to share in the care of one of your patients and hope to have another opportunity to work with you in the near future. Sincerely,    Jose Nuñez, OT      Referring Provider:     I certify that I have read the below Plan of Care and certify the need for these services furnished under this plan of treatment while under my care. Bo Askew  [address] [place] [address]  Via Fax: [phone]        OT Evaluation     Today's date: 8/3/2023  Patient name: Priya Bryant  : 1970  MRN: [id number]  Referring provider: Bo Askew  Dx:   Encounter Diagnosis     ICD-10-CM    1. S/P carpal tunnel release  Z98.890                      Assessment  Assessment details: Patient presenting with OP OT services with a diagnosis of CTR. Patient had surgery completed on 07/10/2023 by Dr. Luisa Osorio. Patient had injections completed prior for conservative management. Patient had EMG completed prior to surgery. Patient reports working as a . Patient's next follow-up appointment is at the end of this month. Patient is right handed. Patient reports symptoms were equal in hands prior to surgery. Patient reports not working since surgery. Patient had sutures removed in right hand and thumb several weeks. He removed his left sutures a week later.    Impairments: abnormal coordination, abnormal or restricted ROM and impaired physical strength    Symptom irritability: moderateUnderstanding of Dx/Px/POC: good   Prognosis: good    Goals  STGs    Pt will increase  strength by 5-10#. Pt will increase wrist strength by 1/2 grade. Pt will increase wrist ROM by 50%     Pt will demonstrate decrease in edema by 25%    Pt will report a decrease in sensation deficits by 25%    Independent with HEP      LTGs     Pt will increase  strength by an additional 5-10#. Pt will increase wrist strength by 1-2 grade. Pt will increase wrist ROM to [facility]. Pt will demonstrate decrease in edema by 50%    Pt will increase pinch strength by 3-5#. Pt will report an increase in ADL/IADL participation    Pt will report a decrease in sensation deficits by 50%      Plan  Plan details: Patient has presenting to OP OT services with a dx of CTR. Patient demonstrating increased pain, decreased strength, decreased ROM and decreased activity. Pt would benefit from continued Occupational Therapy services one time per week for 2 weeks to return to prior level of function and achieve all established goals.  Thank you for the referral!    Patient would benefit from: OT eval, skilled occupational therapy and custom splinting  Referral necessary: Yes  Planned modality interventions: ultrasound, unattended electrical stimulation, thermotherapy: hydrocollator packs, cryotherapy and thermotherapy: paraffin bath  Planned therapy interventions: massage, manual therapy, joint mobilization, patient education, strengthening, stretching, fine motor coordination training, home exercise program, neuromuscular re-education, self care, therapeutic exercise and therapeutic activities  Frequency: 2x week  Duration in visits: 8  Duration in weeks: 4  Treatment plan discussed with: patient        Subjective Evaluation    History of Present Illness  Mechanism of injury: B/L CTR, L TFR          Not a recurrent problem   Quality of life: good    Patient Goals  Patient goals for therapy: decreased edema, decreased pain, increased motion, increased strength and independence with ADLs/IADLs    Pain  Current pain ratin  At best pain ratin  At worst pain ratin  Location: B/L Wrists    Social Support    Employment status: working  Hand dominance: right      Diagnostic Tests  EMG: abnormal  Treatments  Current treatment: occupational therapy        Objective     Observations     Left Wrist/Hand   Positive for incision. Right Wrist/Hand   Positive for incision. Additional Observation Details  Patient presenting with bilateral wrists incision along volar wrist measuring in 2.5 cm with routine healing.  Patient also has a 2.5 cm in right IP with routine healing noted    Neurological Testing     Sensation     Wrist/Hand   Left   Diminished: light touch    Right   Diminished: light touch    Active Range of Motion     Left Wrist   Wrist flexion: 50 degrees   Wrist extension: 50 degrees   Radial deviation: 15 degrees   Ulnar deviation: 30 degrees     Right Wrist   Wrist flexion: 50 degrees   Wrist extension: 45 degrees   Radial deviation: 15 degrees   Ulnar deviation: 35 degrees     Left Thumb     Opposition: Opposition all but SF    Right Thumb   Flexion     MP: 40    DIP: 45  Opposition: Opposition all but SF    Additional Active Range of Motion Details  Decreased wrist strength bilaterally  Soft composite fist noted    Strength/Myotome Testing     Left Wrist/Hand   Wrist extension: 3-  Wrist flexion: 3-  Radial deviation: 3+  Ulnar deviation: 3+     (2nd hand position)     Trial 1: 23    Thumb Strength  Key/Lateral Pinch     Trial 1: 5    Right Wrist/Hand   Wrist extension: 3-  Wrist flexion: 3-  Radial deviation: 3+  Ulnar deviation: 3+     (2nd hand position)     Trial 1: 15    Thumb Strength   Key/Lateral Pinch     Trial 1: 4    Additional Strength Details  Patient demonstrating with a decrease in  and pinch strength bilaterally    Swelling     Left Wrist/Hand   Circumference MCP: 22 cm  Circumference wrist: 18.3 cm    Additional Swelling Details  Increased swelling bilaterally as per patient    Right Wrist/Hand   Circumference MCP: 21.9 cm  Circumference wrist: 18.3 cm    Additional Swelling Details  Increased swelling bilaterally as per patient  Neuro Exam:     Functional outcomes   Left 9 peg hole test: 22.24 (seconds)  Right 9 hole peg test: 18.54 (seconds)    Patient tolerated session well. Patient and therapist discussed exercises as per initial HEP. Patient verbalized understanding of education and demonstrated proper technique. Therapist will make increases as tolerated.  Continue with POC            Precautions s/p B/L CTR  Initial Evaluation completed on: 08/02/2023  Re-Evaluation needs to be completed before: 09/02/2023  Insurance: [employer]  FOTO: 08/02/2023  Auth Visits: N/A          Manuals 08/02/2023       Scar Massage IASTM  GT #3  GT#4  10' B/L       Manual Massage        PROM Wrist All Planes 30 sec x 3       Edema Massage        Cupping                Neuro Re-Ed  08/02/2023                                                               Ther Ex 08/02/2023       TGE        Median Nerve Glide HEP       Prayer Stretch        Wrist AROM   Flex/Ext  RD/UD HEP       Digi-Flex        Wrist Maze        Hand Power Pro                Ther Activity 08/02/2023       Tension Pin w/ Pom Pom                                        Modalities 08/02/2023       Ultrasound        MH        Paraffin Performed at end of session  10'

## 2023-08-04 ENCOUNTER — OFFICE VISIT (OUTPATIENT)
Dept: OCCUPATIONAL THERAPY | Facility: CLINIC | Age: 53
End: 2023-08-04

## 2023-08-04 DIAGNOSIS — Z98.890 S/P CARPAL TUNNEL RELEASE: Primary | ICD-10-CM

## 2023-08-04 PROCEDURE — 97110 THERAPEUTIC EXERCISES: CPT

## 2023-08-04 PROCEDURE — 97018 PARAFFIN BATH THERAPY: CPT

## 2023-08-04 PROCEDURE — 97035 APP MDLTY 1+ULTRASOUND EA 15: CPT

## 2023-08-04 PROCEDURE — 97140 MANUAL THERAPY 1/> REGIONS: CPT

## 2023-08-04 NOTE — PROGRESS NOTES
Daily Note     Today's date: 2023  Patient name: Magnus Ramirez  : 1970  MRN: [id number]  Referring provider: Callie Perez PA-C  Dx:   Encounter Diagnosis     ICD-10-CM    1. S/P carpal tunnel release  Z98.890                      Subjective: "They felt good."      Objective: See treatment diary below      Assessment: Tolerated treatment well. Patient exhibited good technique with therapeutic exercises and would benefit from continued OT. Patient reports an improvement of symptoms after last session. Patient tolerated additional progressive exercises this date to improve functional use. Plan: Continue per plan of care. Progress treatment as tolerated.          Precautions s/p B/L CTR  Initial Evaluation completed on: 2023  Re-Evaluation needs to be completed before: 2023  Insurance: [employer]  FOTO: 2023  Auth Visits: N/A          Manuals 2023      Scar Massage IASTM  GT #3  GT#4  10' B/L IASTM  GT #3  GT#4  10' B/L      Manual Massage        PROM Wrist All Planes 30 sec x 3 30 sec x 3      Edema Massage        Cupping                Neuro Re-Ed  2023                                                              Ther Ex 2023      TGE        Median Nerve Glide HEP       Prayer Stretch  30 sec x 3      Wrist AROM   Flex/Ext  RD/UD HEP       Digi-Flex        Wrist Maze  X 5 B/L      Hand Power Pro        Flex Bar  Twists  Bends  Yellow  X 20  X 20              Ther Activity 2023      Tension Pin w/ Pom Pom                                        Modalities 2023      Ultrasound  3.3 MHz  1.0 intensity  50%      MH        Paraffin Performed at end of session  10' Performed at end of session  10'

## 2023-08-07 ENCOUNTER — OFFICE VISIT (OUTPATIENT)
Dept: OCCUPATIONAL THERAPY | Facility: CLINIC | Age: 53
End: 2023-08-07

## 2023-08-07 DIAGNOSIS — Z98.890 S/P CARPAL TUNNEL RELEASE: Primary | ICD-10-CM

## 2023-08-07 PROCEDURE — 97110 THERAPEUTIC EXERCISES: CPT

## 2023-08-07 PROCEDURE — 97140 MANUAL THERAPY 1/> REGIONS: CPT

## 2023-08-07 PROCEDURE — 97035 APP MDLTY 1+ULTRASOUND EA 15: CPT

## 2023-08-07 PROCEDURE — 97018 PARAFFIN BATH THERAPY: CPT

## 2023-08-07 NOTE — PROGRESS NOTES
Daily Note     Today's date: 2023  Patient name: Aracelis Toscano  : 1970  MRN: [id number]  Referring provider: Analia Chavez PA-C  Dx:   Encounter Diagnosis     ICD-10-CM    1. S/P carpal tunnel release  Z98.890                      Subjective: "They get better everyday."      Objective: See treatment diary below      Assessment: Tolerated treatment well. Patient exhibited good technique with therapeutic exercises and would benefit from continued OT. Patient tolerated session well with additional strengthening parameters completed this date. Patient and therapist discussed d/c to HEP after next session on Friday. Plan: Continue per plan of care. Progress treatment as tolerated.        Precautions s/p B/L CTR  Initial Evaluation completed on: 2023  Re-Evaluation needs to be completed before: 2023  Insurance: [employer]  FOTO: 2023  Auth Visits: N/A          Manuals 2023     Scar Massage IASTM  GT #3  GT#4  10' B/L IASTM  GT #3  GT#4  10' B/L IASTM  GT #3  GT#4  10' B/L     Manual Massage        PROM Wrist All Planes 30 sec x 3 30 sec x 3 30 sec x 3     Edema Massage        Cupping                Neuro Re-Ed  2023                                                             Ther Ex 2023     TGE        Median Nerve Glide HEP       Prayer Stretch  30 sec x 3 30 sec x 3     Wrist AROM   Flex/Ext  RD/UD HEP       Digi-Flex   Green x 20     Wrist Maze  X 5 B/L X 5 B/L     Hand Power Pro        Flex Bar  Twists  Bends  Yellow  X 20  X 20 Yellow  X 20  X 20             Ther Activity 2023     Tension Pin w/ Pom Pom   Green TP                                     Modalities 2023     Ultrasound  3.3 MHz  1.0 intensity  50% 3.3 MHz  1.0 intensity  50%     MH        Paraffin Performed at end of session  10' Performed at end of session  10' Performed at end of session  10'

## 2023-08-10 NOTE — PROGRESS NOTES
Daily Note     Today's date: 8/10/2023  Patient name: Lester Walton  : 1970  MRN: [id number]  Referring provider: Connie Fabian PA-C  Dx:   Encounter Diagnosis     ICD-10-CM    1. S/P carpal tunnel release  Z98.890                      Subjective: My right is worse than my left. Objective: See treatment diary below      Assessment: Tolerated treatment well. Patient demonstrated fatigue post treatment, exhibited good technique with therapeutic exercises and would benefit from continued OT. Pain pre 6-7, decreased post tx. Pt reports he has feeling around incisions now, didn't have after surgery. Plan: Continue per plan of care. Progress treatment as tolerated.        Precautions s/p B/L CTR  Initial Evaluation completed on: 2023  Re-Evaluation needs to be completed before: 2023  Insurance: [employer]  FOTO: 2023  Auth Visits: N/A          Manuals 2023    Scar Massage IASTM  GT #3  GT#4  10' B/L IASTM  GT #3  GT#4  10' B/L IASTM  GT #3  GT#4  10' B/L GT#3  10' b/l    Manual Massage        PROM Wrist All Planes 30 sec x 3 30 sec x 3 30 sec x 3 30 sec x 3    Edema Massage        Cupping                Neuro Re-Ed  2023                                                            Ther Ex 2023    TGE        Median Nerve Glide HEP       Prayer Stretch  30 sec x 3 30 sec x 3 30 sec x 3    Wrist AROM   Flex/Ext  RD/UD HEP       Digi-Flex   Green x 20 Green x 20    Wrist Maze  X 5 B/L X 5 B/L X 5 b/l    Hand Power Pro    Blue RB x 20    Flex Bar  Twists  Bends  Yellow  X 20  X 20 Yellow  X 20  X 20 Yellow  X 20  X 20            Ther Activity 2023    Tension Pin w/ Pom Pom   Green TP Green TP    Grooved pegs    R 1:39  L 1:49                            Modalities 2023 2023 2023 2023    Ultrasound  3.3 MHz  1.0 intensity  50% 3.3 MHz  1.0 intensity  50% 3.3 MHz  1.0 intensity  50%    MH        Paraffin Performed at end of session  10' Performed at end of session  10' Performed at end of session  10' Performed  End of session  10'

## 2023-08-11 ENCOUNTER — OFFICE VISIT (OUTPATIENT)
Dept: OCCUPATIONAL THERAPY | Facility: CLINIC | Age: 53
End: 2023-08-11

## 2023-08-11 DIAGNOSIS — Z98.890 S/P CARPAL TUNNEL RELEASE: Primary | ICD-10-CM

## 2023-08-11 PROCEDURE — 97110 THERAPEUTIC EXERCISES: CPT

## 2023-08-11 PROCEDURE — 97140 MANUAL THERAPY 1/> REGIONS: CPT

## 2023-08-14 ENCOUNTER — OFFICE VISIT (OUTPATIENT)
Dept: OCCUPATIONAL THERAPY | Facility: CLINIC | Age: 53
End: 2023-08-14

## 2023-08-14 DIAGNOSIS — Z98.890 S/P CARPAL TUNNEL RELEASE: Primary | ICD-10-CM

## 2023-08-14 PROCEDURE — 97140 MANUAL THERAPY 1/> REGIONS: CPT

## 2023-08-14 PROCEDURE — 97018 PARAFFIN BATH THERAPY: CPT

## 2023-08-14 PROCEDURE — 97110 THERAPEUTIC EXERCISES: CPT

## 2023-08-14 NOTE — PROGRESS NOTES
Daily Note     Today's date: 2023  Patient name: Lester Walton  : 1970  MRN: [id number]  Referring provider: Connie Faiban PA-C  Dx:   Encounter Diagnosis     ICD-10-CM    1. S/P carpal tunnel release  Z98.890                      Subjective: It feels better since you did that cupping. Objective: See treatment diary below      Assessment: Tolerated treatment well. Patient demonstrated fatigue post treatment, exhibited good technique with therapeutic exercises and would benefit from continued OT. Pt verbalized feeling better post tx., pre 6 decreased post tx. Plan: Continue per plan of care. Progress treatment as tolerated.        Precautions s/p B/L CTR  Initial Evaluation completed on: 2023  Re-Evaluation needs to be completed before: 2023  Insurance: [employer]  FOTO: 2023  Auth Visits: N/A          Manuals 2023   Scar Massage IASTM  GT #3  GT#4  10' B/L IASTM  GT #3  GT#4  10' B/L IASTM  GT #3  GT#4  10' B/L GT#3  10' b/l GT #3  10' b/l   Manual Massage        PROM Wrist All Planes 30 sec x 3 30 sec x 3 30 sec x 3 30 sec x 3 30 sec x 3   Edema Massage        Cupping     3' ea           Neuro Re-Ed  2023                                                           Ther Ex 2023   TGE        Median Nerve Glide HEP       Prayer Stretch  30 sec x 3 30 sec x 3 30 sec x 3 30 sec x 3   Wrist AROM   Flex/Ext  RD/UD HEP       Digi-Flex   Green x 20 Green x 20 Green x 20   Wrist Maze  X 5 B/L X 5 B/L X 5 b/l X 5 b/l   Hand Power Pro    Blue RB x 20 Black level 1 x 20   Flex Bar  Twists  Bends  Yellow  X 20  X 20 Yellow  X 20  X 20 Yellow  X 20  X 20 Green  X 20  X 20           Ther Activity 2023 2023 2023 2023 2023   Tension Pin w/ Pom Pom   Green TP Green TP Green TP   Grooved pegs    R 1:39  L 1:49 Modalities 08/02/2023 08/04/2023 08/07/2023 8/11/2023 8/14/2023   Ultrasound  3.3 MHz  1.0 intensity  50% 3.3 MHz  1.0 intensity  50% 3.3 MHz  1.0 intensity  50% 3.3 MHz  1.0 intensity  50%   MH        Paraffin Performed at end of session  10' Performed at end of session  10' Performed at end of session  10' Performed  End of session  10' Performed end of session  10'

## 2023-08-21 ENCOUNTER — OFFICE (OUTPATIENT)
Dept: URBAN - METROPOLITAN AREA CLINIC 116 | Facility: CLINIC | Age: 53
Setting detail: OPHTHALMOLOGY
End: 2023-08-21
Payer: COMMERCIAL

## 2023-08-21 DIAGNOSIS — H52.4: ICD-10-CM

## 2023-08-21 DIAGNOSIS — Z01.00: ICD-10-CM

## 2023-08-21 PROCEDURE — 92310 CONTACT LENS FITTING OU: CPT | Performed by: OPTOMETRIST

## 2023-08-21 PROCEDURE — 92014 COMPRE OPH EXAM EST PT 1/>: CPT | Performed by: OPTOMETRIST

## 2023-08-21 ASSESSMENT — REFRACTION_MANIFEST
OD_ADD: +2.50
OS_SPHERE: +5.50
OD_VA1: 20/20
OS_AXIS: 080
OD_SPHERE: +5.25
OD_SPHERE: PLANO
OS_CYLINDER: SPH
OS_SPHERE: +0.50
OS_SPHERE: +0.25
OS_CYLINDER: +1.00
OS_CYLINDER: SPH
OS_VA1: 20/30
OD_AXIS: 088
OD_VA1: 20/20
OS_VA1: 20/20
OD_ADD: +2.50
OD_SPHERE: PLANO
OS_VA1: 20/20
OS_ADD: +2.50
OD_CYLINDER: +0.50
OD_VA1: 20/30
OS_ADD: +2.50

## 2023-08-21 ASSESSMENT — REFRACTION_CURRENTRX
OS_CYLINDER: SPH
OS_OVR_VA: 20/
OD_ADD: +2.50
OD_OVR_VA: 20/
OD_SPHERE: PLANO
OS_SPHERE: +0.25
OS_ADD: +2.50

## 2023-08-21 ASSESSMENT — TONOMETRY
OS_IOP_MMHG: 20
OD_IOP_MMHG: 20

## 2023-08-21 ASSESSMENT — SUPERFICIAL PUNCTATE KERATITIS (SPK)
OD_SPK: 1+
OS_SPK: 1+

## 2023-08-21 ASSESSMENT — CONFRONTATIONAL VISUAL FIELD TEST (CVF)
OD_FINDINGS: FULL
OS_FINDINGS: FULL

## 2023-08-21 ASSESSMENT — KERATOMETRY
OS_AXISANGLE_DEGREES: 165
METHOD_AUTO_MANUAL: MANUAL
OD_AXISANGLE_DEGREES: 005
OD_K2POWER_DIOPTERS: 41.75
OS_K2POWER_DIOPTERS: 42.00
OD_K1POWER_DIOPTERS: 41.25
OS_K1POWER_DIOPTERS: 41.25

## 2023-08-21 ASSESSMENT — SPHEQUIV_DERIVED
OD_SPHEQUIV: 5.5
OS_SPHEQUIV: 0.125
OD_SPHEQUIV: 0.25
OS_SPHEQUIV: 6

## 2023-08-21 ASSESSMENT — REFRACTION_AUTOREFRACTION
OS_SPHERE: +0.50
OS_AXIS: 085
OD_AXIS: 075
OS_CYLINDER: -0.75
OD_SPHERE: +0.50
OD_CYLINDER: -0.50

## 2023-08-21 ASSESSMENT — VISUAL ACUITY
OS_BCVA: 20/20
OD_BCVA: 20/25-1

## 2023-08-21 ASSESSMENT — AXIALLENGTH_DERIVED
OD_AL: 22.263
OS_AL: 22.0507
OS_AL: 24.2498
OD_AL: 24.2468

## 2023-09-09 ENCOUNTER — OFFICE (OUTPATIENT)
Dept: URBAN - METROPOLITAN AREA CLINIC 116 | Facility: CLINIC | Age: 53
Setting detail: OPHTHALMOLOGY
End: 2023-09-09
Payer: COMMERCIAL

## 2023-09-09 DIAGNOSIS — H52.4: ICD-10-CM

## 2023-09-09 PROBLEM — Z01.00 ENCOUNTER FOR EXAMINATION OF EYES AND VISION WITHOUT ABNORMAL FINDINGS: Status: ACTIVE | Noted: 2023-08-21

## 2023-09-09 PROCEDURE — N/C NO CHARGE: Performed by: OPTOMETRIST

## 2023-09-09 ASSESSMENT — REFRACTION_MANIFEST
OD_ADD: +2.50
OS_CYLINDER: SPH
OD_CYLINDER: +0.50
OD_ADD: +2.50
OD_SPHERE: PLANO
OS_SPHERE: +0.50
OD_SPHERE: +5.25
OS_ADD: +2.50
OS_VA1: 20/30
OS_SPHERE: +5.50
OS_AXIS: 080
OS_SPHERE: +0.25
OD_VA1: 20/20
OD_AXIS: 088
OS_CYLINDER: +1.00
OS_VA1: 20/20
OS_CYLINDER: SPH
OS_VA1: 20/20
OD_VA1: 20/20
OS_ADD: +2.50
OD_SPHERE: PLANO
OD_VA1: 20/30

## 2023-09-09 ASSESSMENT — KERATOMETRY
OS_K2POWER_DIOPTERS: 42.00
OS_K1POWER_DIOPTERS: 41.00
OD_K2POWER_DIOPTERS: 41.75
OS_AXISANGLE_DEGREES: 150
OD_K1POWER_DIOPTERS: 41.25
OD_AXISANGLE_DEGREES: 144
METHOD_AUTO_MANUAL: MANUAL

## 2023-09-09 ASSESSMENT — VISUAL ACUITY
OS_BCVA: 20/20
OD_BCVA: 20/25-1

## 2023-09-09 ASSESSMENT — SPHEQUIV_DERIVED
OD_SPHEQUIV: 5.5
OD_SPHEQUIV: 0.75
OS_SPHEQUIV: 0.25
OS_SPHEQUIV: 6

## 2023-09-09 ASSESSMENT — CONFRONTATIONAL VISUAL FIELD TEST (CVF)
OS_FINDINGS: FULL
OD_FINDINGS: FULL

## 2023-09-09 ASSESSMENT — TONOMETRY
OD_IOP_MMHG: 18
OS_IOP_MMHG: 20

## 2023-09-09 ASSESSMENT — REFRACTION_CURRENTRX
OD_ADD: +2.50
OS_CYLINDER: SPH
OS_OVR_VA: 20/
OD_SPHERE: PLANO
OS_SPHERE: +0.25
OD_OVR_VA: 20/
OS_ADD: +2.50

## 2023-09-09 ASSESSMENT — SUPERFICIAL PUNCTATE KERATITIS (SPK)
OD_SPK: 1+
OS_SPK: 1+

## 2023-09-09 ASSESSMENT — REFRACTION_AUTOREFRACTION
OS_SPHERE: +0.75
OD_AXIS: 083
OS_AXIS: 083
OD_CYLINDER: -1.00
OD_SPHERE: +1.25
OS_CYLINDER: -1.00

## 2023-09-09 ASSESSMENT — AXIALLENGTH_DERIVED
OS_AL: 24.2468
OD_AL: 24.0428
OS_AL: 22.0909
OD_AL: 22.263

## 2023-10-04 NOTE — ED STATDOCS - CPE ED RESP NORM
SNF PROGRESS NOTE      Cc- s/p esophageal perf with esophagectomy. [place] [address] tube      Patient is a Theron Chavarria 75 y.o. male who is being seen at [facility]. Patient had an esophageal perforation with esophagectomy. Noted to have a R pleural effusion R VATS was done. GJ tube was placed. She received 1 unit PRBC. He tested COVID + on Monday 9/25. Plan for changing with tube 10/9/23    Patient sitting up in the chair. He is frustrated with his tubing situation and concerned about going home with this,         No past medical history on file. Patient has no known allergies. VS reviewed    Gen- Alert and oriented x 3  Heart- RRR no murmur no LE edema   Lungs- CTA b/l no resp distress RA oxygen   Abd- bs x 4      + GJ tube   + left chest  Spit bag.   + PICC          Assessment and Plan    S/p Esophageal Perf with esophagectomy   S/p IV zosyn until 9/27/23 and levaquin until 9/27/23  S/p Fluconazole until 9/27/23  Pleural Effusion s/p VATS  S/p thoracic surgery 9/7  HLD   On statin   Depression   On zoloft. PE/DVT  On eliquis   Anemia. S/p 1 unit PRBC in the hospital   Dysphagia      GJ tube --> plan to replace system. To clamp tube with meds  Nocturnal feed.      COVID 19  Tested positive 9/25/23  In isolation   Paxlovid and decadron started      Glenis Holland DO [facility]     Electronically signed by: Danna Souza DO on 10/1/2023
Applied
normal...

## 2023-11-15 ENCOUNTER — EVALUATION (OUTPATIENT)
Dept: OCCUPATIONAL THERAPY | Facility: CLINIC | Age: 53
End: 2023-11-15

## 2023-11-15 DIAGNOSIS — G56.03 BILATERAL CARPAL TUNNEL SYNDROME: Primary | ICD-10-CM

## 2023-11-15 DIAGNOSIS — M65.312 TRIGGER THUMB OF LEFT HAND: ICD-10-CM

## 2023-11-15 PROCEDURE — 97035 APP MDLTY 1+ULTRASOUND EA 15: CPT

## 2023-11-15 PROCEDURE — 97018 PARAFFIN BATH THERAPY: CPT

## 2023-11-15 PROCEDURE — 97166 OT EVAL MOD COMPLEX 45 MIN: CPT

## 2023-11-15 PROCEDURE — 97140 MANUAL THERAPY 1/> REGIONS: CPT

## 2023-11-15 NOTE — PROGRESS NOTES
OT Evaluation     Today's date: 11/15/2023  Patient name: Sol Dorman  : 1970  MRN: [id number]  Referring provider: Yolanda Lobato MD  Dx:   Encounter Diagnosis     ICD-10-CM    1. Bilateral carpal tunnel syndrome  G56.03       2. Trigger thumb of left hand  M65.312                      Assessment  Assessment details: Patient presenting with a dx of bilateral CTS after recent carpal tunnel release and left trigger finger of thumb. Patient reports reports increased stiffness in bilateral hands in the AM. Patient has appointment with PCP on 2023. Patient had recent appointment with Dr. Brenna Quiñonez on 2023. Patient reports symptoms include locking of left thumb and continued tenderness of bilateral incision sites. Patient reports he is unable to hold tools and is experienced decreased strength. Impairments: abnormal or restricted ROM, activity intolerance, impaired physical strength and pain with function  Other impairment: sensation deficits    Symptom irritability: moderateUnderstanding of Dx/Px/POC: good   Prognosis: good    Goals  STGs    Pt will increase  strength by 5-10#. - Not Met    Pt will increase wrist strength by 1/2 grade. - Not Met    Pt will increase wrist ROM by 50%. - Not Met    Pt will demonstrate decrease in edema by 25%. - Not Met    Pt will report a decrease in sensation deficits by 25%. - Not Met    Independent with HEP. - Not Met      LTGs     Pt will increase  strength by an additional 5-10#. - Not Met    Pt will increase wrist strength by 1-2 grade. - Not Met    Pt will increase wrist ROM to [facility]. - Not Met    Pt will demonstrate decrease in edema by 50%. - Not Met    Pt will report an increase in ADL/IADL participation. - Not Met    Pt will report a decrease in sensation deficits by 50%.  - Not Met        Plan  Plan details: Patient has presenting to OP OT services with a dx of B/L CTS and left trigger thumb Patient demonstrating increased pain, decreased strength, decreased ROM and decreased activity. Pt would benefit from continued Occupational Therapy services two times per week for 4 weeks to return to prior level of function and achieve all established goals.  Thank you for the referral!    Patient would benefit from: OT eval and skilled occupational therapy  Referral necessary: Yes  Planned modality interventions: ultrasound, thermotherapy: hydrocollator packs, unattended electrical stimulation and cryotherapy  Planned therapy interventions: joint mobilization, manual therapy, massage, patient education, therapeutic activities, therapeutic exercise, stretching, strengthening, self care, home exercise program and fine motor coordination training  Frequency: 2x week  Duration in visits: 8  Duration in weeks: 4  Treatment plan discussed with: patient        Subjective Evaluation    History of Present Illness  Date of surgery: 2023  Mechanism of injury: surgery  Mechanism of injury: Left Thumb Trigger   B/L CTR          Not a recurrent problem   Quality of life: good    Patient Goals  Patient goals for therapy: decreased edema, increased motion, increased strength and independence with ADLs/IADLs    Pain  Current pain rating: 10  At best pain ratin  At worst pain rating: 10  Location: B/L Wrist  Quality: sharp and radiating    Social Support    Employment status: working  Hand dominance: right    Treatments  Previous treatment: occupational therapy  Current treatment: occupational therapy        Objective     Neurological Testing     Sensation     Wrist/Hand   Left   Diminished: light touch    Right   Diminished: light touch    Active Range of Motion     Left Wrist   Wrist flexion: 55 degrees   Wrist extension: 55 degrees   Radial deviation: 20 degrees   Ulnar deviation: 30 degrees     Right Wrist   Wrist flexion: 50 degrees   Wrist extension: 50 degrees   Radial deviation: 15 degrees   Ulnar deviation: 30 degrees     Left Thumb   Flexion     MP: 45 degrees    DIP: 45 degrees    Opposition: Able to but discomfort with SF    Right Thumb   Flexion     MP: 40    DIP: 45  Opposition: Able to but discomfort with SF    Additional Active Range of Motion Details  Patient reports increase sensation when completing wrist flexion  Soft composite fist bilaterally    Strength/Myotome Testing     Left Wrist/Hand   Wrist extension: 3-  Wrist flexion: 3-  Radial deviation: 3-  Ulnar deviation: 3-     (2nd hand position)     Trial 1: 22    Thumb Strength  Key/Lateral Pinch     Trial 1: 2    Right Wrist/Hand   Wrist extension: 3-  Wrist flexion: 3-  Radial deviation: 3-  Ulnar deviation: 3     (2nd hand position)     Trial 1: 20    Thumb Strength   Key/Lateral Pinch     Trial 1: 0    Additional Strength Details  Patient demonstrating with a decrease in wrist,  and pinch strength compared to unaffected upper extremity.       Swelling     Left Wrist/Hand   Circumference wrist: 18.2 cm    Right Wrist/Hand   Circumference wrist: 18.2 cm  Neuro Exam:     Functional outcomes   Right 9 hole peg test: 24.24 (seconds)             Precautions s/p B/L CTR  Initial Evaluation completed on: 11/15/2023  Re-Evaluation needs to be completed before: 12/15/2023  Insurance: [employer]  FOTO: 08/02/2023  Auth Visits: N/A          Manuals 11/15/2023       Scar Massage IASTM  GT #3  GT#4  10' B/L       Manual Massage        PROM Wrist All Planes        Edema Massage        Cupping                Neuro Re-Ed  11/15/2023                                                               Ther Ex 11/15/2023       TGE        Median Nerve Glide        Prayer Stretch        Wrist AROM   Flex/Ext  RD/UD        Digi-Flex        Wrist Maze        Hand Power Pro        Flex Bar  Twists  Bends                Ther Activity 11/15/2023       Tension Pin w/ Pom Pom        Grooved Pegs                                Modalities 11/15/2023       Ultrasound 3.3 Mhz  50%  0.8 intensity  10'       MH        Paraffin Performed at end of session  10'       [employer]

## 2023-11-15 NOTE — LETTER
November 15, 2023      No Recipients    Patient: Rand Lam   YOB: 1970   Date of Visit: 11/15/2023     Encounter Diagnosis     ICD-10-CM    1. Bilateral carpal tunnel syndrome  G56.03       2. Trigger thumb of left hand  M65.312           Dear Dr. Neftali Ortega:    Thank you for your recent referral of Rand Lam. Please review the attached evaluation summary from Tanvir's recent visit. Please verify that you agree with the plan of care by signing the attached order. If you have any questions or concerns, please do not hesitate to call. I sincerely appreciate the opportunity to share in the care of one of your patients and hope to have another opportunity to work with you in the near future. Sincerely,    [employer], OT      Referring Provider:     I certify that I have read the below Plan of Care and certify the need for these services furnished under this plan of treatment while under my care. Matilda Lacy MD  [address] [place], [place] [id number]  Via Fax: [phone]        OT Evaluation     Today's date: 11/15/2023  Patient name: Rand aLm  : 1970  MRN: [id number]  Referring provider: Matilda Lacy MD  Dx:   Encounter Diagnosis     ICD-10-CM    1. Bilateral carpal tunnel syndrome  G56.03       2. Trigger thumb of left hand  M65.312                      Assessment  Assessment details: Patient presenting with a dx of bilateral CTS after recent carpal tunnel release and left trigger finger of thumb. Patient reports reports increased stiffness in bilateral hands in the AM. Patient has appointment with PCP on 2023. Patient had recent appointment with Dr. Neftali Ortega on 2023. Patient reports symptoms include locking of left thumb and continued tenderness of bilateral incision sites. Patient reports he is unable to hold tools and is experienced decreased strength.     Impairments: abnormal or restricted ROM, activity intolerance, impaired physical strength and pain with function  Other impairment: sensation deficits    Symptom irritability: moderateUnderstanding of Dx/Px/POC: good   Prognosis: good    Goals  STGs    Pt will increase  strength by 5-10#. - Not Met    Pt will increase wrist strength by 1/2 grade. - Not Met    Pt will increase wrist ROM by 50%. - Not Met    Pt will demonstrate decrease in edema by 25%. - Not Met    Pt will report a decrease in sensation deficits by 25%. - Not Met    Independent with HEP. - Not Met      LTGs     Pt will increase  strength by an additional 5-10#. - Not Met    Pt will increase wrist strength by 1-2 grade. - Not Met    Pt will increase wrist ROM to [facility]. - Not Met    Pt will demonstrate decrease in edema by 50%. - Not Met    Pt will report an increase in ADL/IADL participation. - Not Met    Pt will report a decrease in sensation deficits by 50%. - Not Met        Plan  Plan details: Patient has presenting to OP OT services with a dx of B/L CTS and left trigger thumb Patient demonstrating increased pain, decreased strength, decreased ROM and decreased activity. Pt would benefit from continued Occupational Therapy services two times per week for 4 weeks to return to prior level of function and achieve all established goals.  Thank you for the referral!    Patient would benefit from: OT eval and skilled occupational therapy  Referral necessary: Yes  Planned modality interventions: ultrasound, thermotherapy: hydrocollator packs, unattended electrical stimulation and cryotherapy  Planned therapy interventions: joint mobilization, manual therapy, massage, patient education, therapeutic activities, therapeutic exercise, stretching, strengthening, self care, home exercise program and fine motor coordination training  Frequency: 2x week  Duration in visits: 8  Duration in weeks: 4  Treatment plan discussed with: patient        Subjective Evaluation    History of Present Illness  Date of surgery: 7/9/2023  Mechanism of injury: surgery  Mechanism of injury: Left Thumb Trigger   B/L CTR          Not a recurrent problem   Quality of life: good    Patient Goals  Patient goals for therapy: decreased edema, increased motion, increased strength and independence with ADLs/IADLs    Pain  Current pain rating: 10  At best pain ratin  At worst pain rating: 10  Location: B/L Wrist  Quality: sharp and radiating    Social Support    Employment status: working  Hand dominance: right    Treatments  Previous treatment: occupational therapy  Current treatment: occupational therapy        Objective     Neurological Testing     Sensation     Wrist/Hand   Left   Diminished: light touch    Right   Diminished: light touch    Active Range of Motion     Left Wrist   Wrist flexion: 55 degrees   Wrist extension: 55 degrees   Radial deviation: 20 degrees   Ulnar deviation: 30 degrees     Right Wrist   Wrist flexion: 50 degrees   Wrist extension: 50 degrees   Radial deviation: 15 degrees   Ulnar deviation: 30 degrees     Left Thumb   Flexion     MP: 45 degrees    DIP: 45 degrees    Opposition: Able to but discomfort with SF    Right Thumb   Flexion     MP: 40    DIP: 45  Opposition: Able to but discomfort with SF    Additional Active Range of Motion Details  Patient reports increase sensation when completing wrist flexion  Soft composite fist bilaterally    Strength/Myotome Testing     Left Wrist/Hand   Wrist extension: 3-  Wrist flexion: 3-  Radial deviation: 3-  Ulnar deviation: 3-     (2nd hand position)     Trial 1: 22    Thumb Strength  Key/Lateral Pinch     Trial 1: 2    Right Wrist/Hand   Wrist extension: 3-  Wrist flexion: 3-  Radial deviation: 3-  Ulnar deviation: 3     (2nd hand position)     Trial 1: 20    Thumb Strength   Key/Lateral Pinch     Trial 1: 0    Additional Strength Details  Patient demonstrating with a decrease in wrist,  and pinch strength compared to unaffected upper extremity.       Swelling     Left Wrist/Hand   Circumference wrist: 18.2 cm    Right Wrist/Hand   Circumference wrist: 18.2 cm  Neuro Exam:     Functional outcomes   Right 9 hole peg test: 24.24 (seconds)             Precautions s/p B/L CTR  Initial Evaluation completed on: 11/15/2023  Re-Evaluation needs to be completed before: 12/15/2023  Insurance: [place]  FOTO: 08/02/2023  Auth Visits: N/A          Manuals 11/15/2023       Scar Massage IASTM  GT #3  GT#4  10' B/L       Manual Massage        PROM Wrist All Planes        Edema Massage        Cupping                Neuro Re-Ed  11/15/2023                                                               Ther Ex 11/15/2023       TGE        Median Nerve Glide        Prayer Stretch        Wrist AROM   Flex/Ext  RD/UD        Digi-Flex        Wrist Maze        Hand Power Pro        Flex Bar  Twists  Bends                Ther Activity 11/15/2023       Tension Pin w/ Pom Pom        Grooved Pegs                                Modalities 11/15/2023       Ultrasound 3.3 Mhz  50%  0.8 intensity  10'       MH        Paraffin Performed at end of session  10'       [employer]

## 2023-11-17 ENCOUNTER — APPOINTMENT (OUTPATIENT)
Dept: OCCUPATIONAL THERAPY | Facility: CLINIC | Age: 53
End: 2023-11-17
Payer: COMMERCIAL

## 2023-11-20 ENCOUNTER — OFFICE VISIT (OUTPATIENT)
Dept: OCCUPATIONAL THERAPY | Facility: CLINIC | Age: 53
End: 2023-11-20

## 2023-11-20 DIAGNOSIS — M65.312 TRIGGER THUMB OF LEFT HAND: ICD-10-CM

## 2023-11-20 DIAGNOSIS — G56.03 BILATERAL CARPAL TUNNEL SYNDROME: Primary | ICD-10-CM

## 2023-11-20 PROCEDURE — 97140 MANUAL THERAPY 1/> REGIONS: CPT

## 2023-11-20 PROCEDURE — 97035 APP MDLTY 1+ULTRASOUND EA 15: CPT

## 2023-11-20 PROCEDURE — 97535 SELF CARE MNGMENT TRAINING: CPT

## 2023-11-20 NOTE — PROGRESS NOTES
Daily Note     Today's date: 2023  Patient name: Eb Alonzo  : 1970  MRN: [id number]  Referring provider: Savage Arguelles MD  Dx:   Encounter Diagnosis     ICD-10-CM    1. Bilateral carpal tunnel syndrome  G56.03       2. Trigger thumb of left hand  M65.312                      Subjective: So, in your opinion, will therapy help this? Objective: See treatment diary below      Assessment: Tolerated treatment well. Patient would benefit from continued OT Pt presents this date curious of therapies affect on condition, with therapist completing education on compensation strategies as well as possibilities of some arthritis affecting hands, with pt to look into more. Pt stating that the wax is the only thing that really seems to help his hands feel better. Pt to look into possible arthritis diagnosis. Plan: Continue per plan of care. Progress treatment as tolerated. Progress treament per protocol.       Precautions s/p B/L CTR  Initial Evaluation completed on: 11/15/2023  Re-Evaluation needs to be completed before: 12/15/2023  Insurance: [employer]  FOTO: 2023  Auth Visits: N/A          Manuals 11/15/2023 2023      Scar Massage IASTM  GT #3  GT#4  10' B/L IASTM  GT #3  5' b/l      Manual Massage  5 min wrist/zendejas aspect 5 ' b/l      PROM Wrist All Planes  Digit/wrist extension education      Edema Massage        Cupping                Neuro Re-Ed  11/15/2023 2023                                                              Ther Ex 11/15/2023 2023      TGE        Median Nerve Glide        Prayer Stretch        Wrist AROM   Flex/Ext  RD/UD        Digi-Flex        Wrist Maze        Hand Power Pro        Flex Bar  Twists  Bends                Ther Activity 11/15/2023 2023      Tension Pin w/ Pom Pom        Grooved Pegs                                Modalities 11/15/2023 2023      Ultrasound 3.3 Mhz  50%  0.8 intensity  10' 3.3 MHz, 50% rate, 0.8 intensity 10 [id number] [facility] Arsalan Hamilton Performed at end of session  10' Performed post session      [employer]

## 2023-12-04 ENCOUNTER — OFFICE VISIT (OUTPATIENT)
Dept: URGENT CARE | Facility: CLINIC | Age: 53
End: 2023-12-04
Payer: COMMERCIAL

## 2023-12-04 VITALS
OXYGEN SATURATION: 96 % | TEMPERATURE: 97.6 F | SYSTOLIC BLOOD PRESSURE: 150 MMHG | DIASTOLIC BLOOD PRESSURE: 84 MMHG | HEIGHT: 63 IN | RESPIRATION RATE: 18 BRPM | HEART RATE: 74 BPM | BODY MASS INDEX: 34.01 KG/M2

## 2023-12-04 DIAGNOSIS — J06.9 ACUTE URI: Primary | ICD-10-CM

## 2023-12-04 DIAGNOSIS — R05.1 ACUTE COUGH: ICD-10-CM

## 2023-12-04 PROCEDURE — G0382 LEV 3 HOSP TYPE B ED VISIT: HCPCS | Performed by: PHYSICIAN ASSISTANT

## 2023-12-04 RX ORDER — METHYLPREDNISOLONE 4 MG/1
TABLET ORAL
Qty: 21 TABLET | Refills: 0 | Status: SHIPPED | OUTPATIENT
Start: 2023-12-04

## 2023-12-04 NOTE — PROGRESS NOTES
[facility]      NAME: Cammy Hudson is a 52 y.o. male  : 1970    MRN: [id number]  DATE: 2023  TIME: 11:18 AM    Assessment and Plan   Acute URI [J06.9]  1. Acute URI        2. Acute cough  methylPREDNISolone 4 MG tablet therapy pack    CANCELED: Poct Covid 19 Rapid Antigen Test          Patient Instructions   Infection appears viral.  Recommend symptomatic treatment. Can take ibuprofen or tylenol as needed for pain or fever. Over the counter cough and cold medications to help with symptoms. Use salt water gargles for sore throat and throat lozenges. Cough drops as needed. Wash hands frequently to prevent the spread of infection. If not improving over the next 7-10 days, follow up with PCP. Symptoms may persist for 10-14 days. To present to the ER if symptoms worsen. Chief Complaint     Chief Complaint   Patient presents with    Cold Like Symptoms     Patient c/o body aches, fever, and cough that started yesterday. History of Present Illness   Cammy Hudson presents to the clinic with wife c/o    Generalized Body Aches  The current episode started in the past 7 days (). The problem occurs constantly. The problem is unchanged. The pain is moderate. Associated symptoms include congestion, ear pain, a sore throat, fatigue, a fever, coughing, shortness of breath, wheezing and muscle aches. Pertinent negatives include no ear discharge, eye discharge, eye itching, eye pain, eye redness, headaches, photophobia, chest pain, abdominal pain or rash. Past treatments include one or more OTC medications. The treatment provided mild relief. Review of Systems   Review of Systems   Constitutional:  Positive for chills, fatigue and fever. Negative for diaphoresis. HENT:  Positive for congestion, ear pain, sinus pressure, sinus pain and sore throat. Negative for ear discharge and facial swelling.     Eyes:  Negative for photophobia, pain, discharge, redness, itching and visual disturbance. Respiratory:  Positive for cough, shortness of breath and wheezing. Negative for apnea and chest tightness. Cardiovascular:  Negative for chest pain and palpitations. Gastrointestinal:  Negative for abdominal pain. Musculoskeletal:  Positive for myalgias. Skin:  Negative for color change, rash and wound. Neurological:  Negative for dizziness and headaches. Hematological:  Negative for adenopathy.          Current Medications     Long-Term Medications   Medication Sig Dispense Refill    lidocaine (Lidoderm) 5 % Apply 1 patch topically over 12 hours daily Remove & Discard patch within 12 hours or as directed by MD (Patient not taking: Reported on 12/4/2023) 7 patch 0    meloxicam (Mobic) 15 mg tablet Take 1 tablet (15 mg total) by mouth daily for 14 days (Patient not taking: Reported on 12/4/2023) 14 tablet 0    methocarbamol (ROBAXIN) 500 mg tablet Take 1 tablet (500 mg total) by mouth 2 (two) times a day as needed for muscle spasms (Patient not taking: Reported on 12/4/2023) 20 tablet 0       Current Allergies     Allergies as of 12/04/2023    (No Known Allergies)            The following portions of the patient's history were reviewed and updated as appropriate: allergies, current medications, past family history, past medical history, past social history, past surgical history and problem list.  Past Medical History:   Diagnosis Date    High cholesterol      Past Surgical History:   Procedure Laterality Date    APPENDECTOMY       Social History     Socioeconomic History    Marital status: Single     Spouse name: Not on file    Number of children: Not on file    Years of education: Not on file    Highest education level: Not on file   Occupational History    Not on file   Tobacco Use    Smoking status: Never    Smokeless tobacco: Never   Vaping Use    Vaping Use: Never used   Substance and Sexual Activity    Alcohol use: Yes     Comment: socially    Drug use: Never    Sexual activity: Not on file   Other Topics Concern    Not on file   Social History Narrative    Not on file     Social Determinants of Health     Financial Resource Strain: Not on file   Food Insecurity: Not on file   Transportation Needs: Not on file   Physical Activity: Not on file   Stress: Not on file   Social Connections: Not on file   Intimate Partner Violence: Not on file   Housing Stability: Not on file       Objective   /84   Pulse 74   Temp 97.6 °F (36.4 °C) (Temporal)   Resp 18   Ht 5' 3" (1.6 m)   SpO2 96%   BMI 34.01 kg/m²      Physical Exam     Physical Exam  Vitals and nursing note reviewed. Constitutional:       General: He is not in acute distress. Appearance: He is well-developed. He is not diaphoretic. HENT:      Head: Normocephalic and atraumatic. Right Ear: Tympanic membrane and external ear normal.      Left Ear: Tympanic membrane and external ear normal.      Nose:      Right Sinus: Maxillary sinus tenderness (mild) present. Left Sinus: Maxillary sinus tenderness (mild) present. Mouth/Throat:      Mouth: Mucous membranes are moist.      Pharynx: No oropharyngeal exudate or posterior oropharyngeal erythema. Eyes:      General: No scleral icterus. Right eye: No discharge. Left eye: No discharge. Conjunctiva/sclera: Conjunctivae normal.   Cardiovascular:      Rate and Rhythm: Normal rate and regular rhythm. Heart sounds: Normal heart sounds. No murmur heard. No friction rub. No gallop. Pulmonary:      Effort: Pulmonary effort is normal. No respiratory distress. Breath sounds: Normal breath sounds. No decreased breath sounds, wheezing, rhonchi or rales. Skin:     General: Skin is warm and dry. Coloration: Skin is not pale. Findings: No erythema or rash. Neurological:      Mental Status: He is alert and oriented to person, place, and time. Psychiatric:         Behavior: Behavior normal.         Thought Content:  Thought content normal.         Judgment: Judgment normal.         Luis Meléndez PA-C

## 2023-12-07 ENCOUNTER — APPOINTMENT (OUTPATIENT)
Dept: URGENT CARE | Facility: CLINIC | Age: 53
End: 2023-12-07

## 2024-01-31 DIAGNOSIS — G47.33 OSA (OBSTRUCTIVE SLEEP APNEA): Primary | ICD-10-CM

## 2024-11-27 ENCOUNTER — APPOINTMENT (OUTPATIENT)
Age: 54
End: 2024-11-27

## 2024-11-27 DIAGNOSIS — Z00.00 ENCOUNTER FOR GENERAL ADULT MEDICAL EXAMINATION WITHOUT ABNORMAL FINDINGS: ICD-10-CM

## 2024-11-27 PROCEDURE — 81001 URINALYSIS AUTO W/SCOPE: CPT | Performed by: EMERGENCY MEDICINE

## 2024-11-27 PROCEDURE — 71045 X-RAY EXAM CHEST 1 VIEW: CPT

## 2024-11-27 PROCEDURE — 86870 RBC ANTIBODY IDENTIFICATION: CPT | Performed by: EMERGENCY MEDICINE

## 2024-11-27 PROCEDURE — 83655 ASSAY OF LEAD: CPT | Performed by: EMERGENCY MEDICINE

## 2024-11-27 PROCEDURE — 83825 ASSAY OF MERCURY: CPT | Performed by: EMERGENCY MEDICINE

## 2024-11-27 PROCEDURE — 82232 ASSAY OF BETA-2 PROTEIN: CPT | Performed by: EMERGENCY MEDICINE

## 2024-11-27 PROCEDURE — 85025 COMPLETE CBC W/AUTO DIFF WBC: CPT | Performed by: EMERGENCY MEDICINE

## 2024-11-27 PROCEDURE — 84202 ASSAY RBC PROTOPORPHYRIN: CPT | Performed by: EMERGENCY MEDICINE

## 2024-11-27 PROCEDURE — 82175 ASSAY OF ARSENIC: CPT | Performed by: EMERGENCY MEDICINE

## 2024-11-27 PROCEDURE — 82570 ASSAY OF URINE CREATININE: CPT | Performed by: EMERGENCY MEDICINE

## 2024-11-27 PROCEDURE — 82300 ASSAY OF CADMIUM: CPT | Performed by: EMERGENCY MEDICINE

## 2025-01-13 ENCOUNTER — TELEPHONE (OUTPATIENT)
Age: 55
End: 2025-01-13

## 2025-01-13 NOTE — TELEPHONE ENCOUNTER
Received a call from the pt's spouse - I did make her aware that a medical communication consent form will need to be completed. She first asked if we took [place] [employer] insurance and she didn't call to see if we were in network - I did verify via our par listing. She stated his pcp Dr. Khang Diego is going to fax a referral (fax number was given) the pt would like the [place] location with Dr. Pitts as his wife sees him as well. The wife was not sure why he needs to be seen - she stated had recent labs done a few weeks ago at the [facility] when at PCP. I did ask her to call them to get the reason for the appt as I was asking her different diagnosis and she wasn't sure at all. She will check and call back to schedule if she doesn't hear anything after the referral is sent. She did verbalize understanding that an appt could not be made until we have the reason.

## 2025-03-06 ENCOUNTER — CONSULT (OUTPATIENT)
Dept: ENDOCRINOLOGY | Facility: CLINIC | Age: 55
End: 2025-03-06
Payer: COMMERCIAL

## 2025-03-06 ENCOUNTER — TELEPHONE (OUTPATIENT)
Age: 55
End: 2025-03-06

## 2025-03-06 VITALS
TEMPERATURE: 97.8 F | HEART RATE: 60 BPM | SYSTOLIC BLOOD PRESSURE: 124 MMHG | RESPIRATION RATE: 18 BRPM | OXYGEN SATURATION: 98 % | DIASTOLIC BLOOD PRESSURE: 76 MMHG | WEIGHT: 191.4 LBS | BODY MASS INDEX: 33.9 KG/M2

## 2025-03-06 DIAGNOSIS — E78.2 MIXED HYPERLIPIDEMIA: ICD-10-CM

## 2025-03-06 DIAGNOSIS — R73.03 PRE-DIABETES: ICD-10-CM

## 2025-03-06 DIAGNOSIS — E66.09 CLASS 1 OBESITY DUE TO EXCESS CALORIES WITH SERIOUS COMORBIDITY AND BODY MASS INDEX (BMI) OF 33.0 TO 33.9 IN ADULT: Primary | ICD-10-CM

## 2025-03-06 DIAGNOSIS — K76.0 NONALCOHOLIC FATTY LIVER: ICD-10-CM

## 2025-03-06 DIAGNOSIS — E66.811 CLASS 1 OBESITY DUE TO EXCESS CALORIES WITH SERIOUS COMORBIDITY AND BODY MASS INDEX (BMI) OF 33.0 TO 33.9 IN ADULT: Primary | ICD-10-CM

## 2025-03-06 DIAGNOSIS — G47.33 OSA ON CPAP: ICD-10-CM

## 2025-03-06 PROCEDURE — 99204 OFFICE O/P NEW MOD 45 MIN: CPT | Performed by: STUDENT IN AN ORGANIZED HEALTH CARE EDUCATION/TRAINING PROGRAM

## 2025-03-06 RX ORDER — TIRZEPATIDE 2.5 MG/.5ML
2.5 INJECTION, SOLUTION SUBCUTANEOUS WEEKLY
Qty: 2 ML | Refills: 0 | Status: SHIPPED | OUTPATIENT
Start: 2025-03-06 | End: 2025-04-03

## 2025-03-06 NOTE — ASSESSMENT & PLAN NOTE
Not on medication, advised to cut down on full fat dairy products, red meat, and processed meat.  We tried statin in the past, reports intolerance, will repeat lipid profile before next visit.

## 2025-03-06 NOTE — ASSESSMENT & PLAN NOTE
Orders:    tirzepatide (Zepbound) 2.5 mg/0.5 mL auto-injector; Inject 0.5 mL (2.5 mg total) under the skin once a week for 28 days

## 2025-03-06 NOTE — PROGRESS NOTES
Name: Tanvir Huntley      : 1970      MRN: [id number]  Encounter Provider: Shaina Pitts MD  Encounter Date: 3/6/2025   Encounter department: [facility] [place]    Chief Complaint   Patient presents with    Advice Only   :  Assessment & Plan  FRANCISCA on CPAP    Orders:    tirzepatide (Zepbound) 2.5 mg/0.5 mL auto-injector; Inject 0.5 mL (2.5 mg total) under the skin once a week for 28 days    Class 1 obesity due to excess calories with serious comorbidity and body mass index (BMI) of 33.0 to 33.9 in adult  We did emphasized that he maintain his efforts with life style modifications.  Emphasized importance of daily exercise, weight loss, caloric reduction, small meals, consumption of multiple servings of fruits and vegetables and avoidance of concentrated sweets such as juice and soda.   He has  tried more than 6 months of lifestyle modifications including diet and activity changes and has had insignificant weight loss of less than 1 lb a week. Patient denies personal and family history of MCT and MEN2 tumors. Patient denies personal history of pancreatitis. Side effects discussed but not limited to diarrhea, bloating, constipation, GI upset, heartburn, increased heart rate, headache, low blood sugar, fatigue and dizziness. Titration and medication administration discussed.  Given hx of FRANCISCA, I prefer Zepbound, which was ordered 2.5 mg weekly and to be increased as he tolerates      Orders:    tirzepatide (Zepbound) 2.5 mg/0.5 mL auto-injector; Inject 0.5 mL (2.5 mg total) under the skin once a week for 28 days    Mixed hyperlipidemia  Not on medication, advised to cut down on full fat dairy products, red meat, and processed meat.  We tried statin in the past, reports intolerance, will repeat lipid profile before next visit.         Pre-diabetes  See plan for obesity.  Zepbound 2.5 mg weekly.      Orders:    Hemoglobin A1C; Future        History of Present Illness   History of  "Present Illness    Tanvir Huntley is a 55 y.o. male who presents for evaluation of obesity and weight gain at the request of Khang Diego MD.      He reports that he has been watching what he eats and now just eating one meal a day, mostly in the morning, avoiding oncentrated sweets such as juice and soda.   He was able to lose weight. His current weight is 191 Ib.        He reports has back problem, \" when  I'm overweight, his back hurts more.\"      He is works as a , moving around a lot, does not exercise regularly.      He reports sleep apnea, using CPAP, although reports difficulty tolerating the face mask.    His blood work from primary office visit reviewed, which showed hyperlipidemia and prediabetes with A1c of 6.0%.        Pertinent Medical History   Hyperlipidemia, prediabetes      Review of Systems as per [facility]       Medical History Reviewed by provider this encounter:     .    Objective   /76 (BP Location: Right arm, Patient Position: Sitting, Cuff Size: Adult)   Pulse 60   Temp 97.8 °F (36.6 °C) (Temporal)   Resp 18   Wt 86.8 kg (191 lb 6.4 oz)   SpO2 98%   BMI 33.90 kg/m²      Body mass index is 33.9 kg/m².  Wt Readings from Last 3 Encounters:   03/06/25 86.8 kg (191 lb 6.4 oz)   05/13/23 87.1 kg (192 lb)   03/29/23 87.1 kg (192 lb)     Physical Exam  Vitals and nursing note reviewed.   Constitutional:       General: He is not in acute distress.     Appearance: He is well-developed. He is obese.   HENT:      Head: Normocephalic and atraumatic.   Eyes:      Conjunctiva/sclera: Conjunctivae normal.   Cardiovascular:      Rate and Rhythm: Normal rate and regular rhythm.      Heart sounds: No murmur heard.  Pulmonary:      Effort: Pulmonary effort is normal. No respiratory distress.      Breath sounds: Normal breath sounds.   Musculoskeletal:         General: No swelling.      Cervical back: Neck supple.   Skin:     General: Skin is warm and dry.   Neurological:      " "Mental Status: He is alert.   Psychiatric:         Mood and Affect: Mood normal.       Physical Exam      Results    Labs:   No results found for: \"HGBA1C\"  Lab Results   Component Value Date    CREATININE 0.84 05/13/2023    CREATININE 0.84 09/27/2022    CREATININE 0.88 05/18/2022    BUN 20 05/13/2023    K 3.8 05/13/2023     05/13/2023    CO2 21 05/13/2023     eGFR   Date Value Ref Range Status   05/13/2023 98 ml/min/1.73sq m Final     Lab Results   Component Value Date    HDL 34 (L) 09/27/2022    TRIG 388 (H) 09/27/2022     Lab Results   Component Value Date    ALT 48 05/13/2023    AST 30 05/13/2023    ALKPHOS 78 05/13/2023     Lab Results   Component Value Date    LUN0JGMRUDIK 1.390 05/18/2022     Component      Latest Ref Rng 9/27/2022   Cholesterol      See Comment mg/dL 237 (H)    Triglycerides      See Comment mg/dL 388 (H)    HDL      >=40 mg/dL 34 (L)    LDL Calculated      0 - 100 mg/dL 125 (H)    Non-HDL Cholesterol      mg/dl 203       Legend:  (H) High  (L) Low  No results found for: \"FREET4\", \"TSI\"    There are no Patient Instructions on file for this visit.    Discussed with the patient and all questioned fully answered. He will call me if any problems arise.    Administrative Statements   I have spent a total time of 45 minutes in caring for this patient on the day of the visit/encounter including Diagnostic results, Prognosis, Risks and benefits of tx options, Instructions for management, Patient and family education, Importance of tx compliance, Risk factor reductions, Impressions, Counseling / Coordination of care, Documenting in the medical record, Reviewing/placing orders in the medical record (including tests, medications, and/or procedures), and Obtaining or reviewing history  .  "

## 2025-03-06 NOTE — TELEPHONE ENCOUNTER
PA for  (Zepbound) 2.5 mg/0.5 mL SUBMITTED to [place]     via      [x]"[employer]-Case ID # [id number]       [x]PA sent as URGENT    All office notes, labs and other pertaining documents and studies sent. Clinical questions answered. Awaiting determination from insurance company.     Turnaround time for your insurance to make a decision on your Prior Authorization can take 7-21 business days.

## 2025-03-06 NOTE — ASSESSMENT & PLAN NOTE
We did emphasized that he maintain his efforts with life style modifications.  Emphasized importance of daily exercise, weight loss, caloric reduction, small meals, consumption of multiple servings of fruits and vegetables and avoidance of concentrated sweets such as juice and soda.   He has  tried more than 6 months of lifestyle modifications including diet and activity changes and has had insignificant weight loss of less than 1 lb a week. Patient denies personal and family history of MCT and MEN2 tumors. Patient denies personal history of pancreatitis. Side effects discussed but not limited to diarrhea, bloating, constipation, GI upset, heartburn, increased heart rate, headache, low blood sugar, fatigue and dizziness. Titration and medication administration discussed.  Given hx of FRANCISCA, I prefer Zepbound, which was ordered 2.5 mg weekly and to be increased as he tolerates      Orders:    tirzepatide (Zepbound) 2.5 mg/0.5 mL auto-injector; Inject 0.5 mL (2.5 mg total) under the skin once a week for 28 days

## 2025-03-07 NOTE — TELEPHONE ENCOUNTER
PA for  (Zepbound) 2.5 mg/0.5 mL  APPROVED     Date(s) approved  March 6, 2025 to September 6, 2025     Case #[id number]     Patient advised by          []MyChart Message  []Phone call   [x]LMOM  []L/M to call office as no active Communication consent on file  []Unable to leave detailed message as VM not approved on Communication consent       Pharmacy advised by    []Fax  []Phone call  []Secure Chat    Specialty Pharmacy    []     Approval letter scanned into Media Yes

## 2025-03-27 DIAGNOSIS — E66.09 CLASS 1 OBESITY DUE TO EXCESS CALORIES WITH SERIOUS COMORBIDITY AND BODY MASS INDEX (BMI) OF 33.0 TO 33.9 IN ADULT: ICD-10-CM

## 2025-03-27 DIAGNOSIS — G47.33 OSA ON CPAP: ICD-10-CM

## 2025-03-27 DIAGNOSIS — E66.811 CLASS 1 OBESITY DUE TO EXCESS CALORIES WITH SERIOUS COMORBIDITY AND BODY MASS INDEX (BMI) OF 33.0 TO 33.9 IN ADULT: ICD-10-CM

## 2025-03-28 DIAGNOSIS — E66.09 CLASS 1 OBESITY DUE TO EXCESS CALORIES WITH SERIOUS COMORBIDITY AND BODY MASS INDEX (BMI) OF 33.0 TO 33.9 IN ADULT: ICD-10-CM

## 2025-03-28 DIAGNOSIS — E66.811 CLASS 1 OBESITY DUE TO EXCESS CALORIES WITH SERIOUS COMORBIDITY AND BODY MASS INDEX (BMI) OF 33.0 TO 33.9 IN ADULT: ICD-10-CM

## 2025-03-28 DIAGNOSIS — R73.03 PRE-DIABETES: Primary | ICD-10-CM

## 2025-03-28 RX ORDER — TIRZEPATIDE 5 MG/.5ML
5 INJECTION, SOLUTION SUBCUTANEOUS WEEKLY
Qty: 2 ML | Refills: 0 | Status: SHIPPED | OUTPATIENT
Start: 2025-03-28 | End: 2025-04-25

## 2025-03-28 RX ORDER — TIRZEPATIDE 2.5 MG/.5ML
INJECTION, SOLUTION SUBCUTANEOUS
Qty: 2 ML | Refills: 0 | OUTPATIENT
Start: 2025-03-28

## 2025-04-08 ENCOUNTER — TELEPHONE (OUTPATIENT)
Dept: FAMILY MEDICINE CLINIC | Facility: CLINIC | Age: 55
End: 2025-04-08

## 2025-04-17 ENCOUNTER — APPOINTMENT (OUTPATIENT)
Dept: RADIOLOGY | Age: 55
End: 2025-04-17
Attending: PHYSICIAN ASSISTANT
Payer: COMMERCIAL

## 2025-04-17 ENCOUNTER — OCCMED (OUTPATIENT)
Dept: URGENT CARE | Age: 55
End: 2025-04-17
Payer: OTHER MISCELLANEOUS

## 2025-04-17 DIAGNOSIS — M54.50 ACUTE RIGHT-SIDED LOW BACK PAIN, UNSPECIFIED WHETHER SCIATICA PRESENT: ICD-10-CM

## 2025-04-17 DIAGNOSIS — M54.50 ACUTE RIGHT-SIDED LOW BACK PAIN, UNSPECIFIED WHETHER SCIATICA PRESENT: Primary | ICD-10-CM

## 2025-04-17 PROCEDURE — 99284 EMERGENCY DEPT VISIT MOD MDM: CPT | Performed by: PHYSICIAN ASSISTANT

## 2025-04-17 PROCEDURE — G0383 LEV 4 HOSP TYPE B ED VISIT: HCPCS | Performed by: PHYSICIAN ASSISTANT

## 2025-04-17 PROCEDURE — 96372 THER/PROPH/DIAG INJ SC/IM: CPT | Performed by: PHYSICIAN ASSISTANT

## 2025-04-17 PROCEDURE — 72110 X-RAY EXAM L-2 SPINE 4/>VWS: CPT

## 2025-04-21 ENCOUNTER — APPOINTMENT (OUTPATIENT)
Age: 55
End: 2025-04-21
Payer: OTHER MISCELLANEOUS

## 2025-04-21 PROCEDURE — 99214 OFFICE O/P EST MOD 30 MIN: CPT | Performed by: STUDENT IN AN ORGANIZED HEALTH CARE EDUCATION/TRAINING PROGRAM

## 2025-04-22 ENCOUNTER — TELEPHONE (OUTPATIENT)
Age: 55
End: 2025-04-22

## 2025-04-22 DIAGNOSIS — E66.09 CLASS 1 OBESITY DUE TO EXCESS CALORIES WITH SERIOUS COMORBIDITY AND BODY MASS INDEX (BMI) OF 33.0 TO 33.9 IN ADULT: Primary | ICD-10-CM

## 2025-04-22 DIAGNOSIS — E66.811 CLASS 1 OBESITY DUE TO EXCESS CALORIES WITH SERIOUS COMORBIDITY AND BODY MASS INDEX (BMI) OF 33.0 TO 33.9 IN ADULT: Primary | ICD-10-CM

## 2025-04-22 RX ORDER — TIRZEPATIDE 7.5 MG/.5ML
7.5 INJECTION, SOLUTION SUBCUTANEOUS WEEKLY
Qty: 2 ML | Refills: 0 | Status: SHIPPED | OUTPATIENT
Start: 2025-04-22 | End: 2025-05-20

## 2025-04-22 NOTE — TELEPHONE ENCOUNTER
Patient's wife called in states that patient will need a refill of the Zepbound sent to the [facility] in [place].  He is currently on 5 mg, has last dose to take this week.  Asks if the dose will be increased?  Please advise and call Verónica.

## 2025-05-16 NOTE — TELEPHONE ENCOUNTER
Patient needs a refill of Zepbound. He is tolerating it well and would like a increase in dose.  He is switching pharmacies. To [facility] in [place]

## 2025-05-22 NOTE — TELEPHONE ENCOUNTER
Patients wife called asking why the Zepbound script was not sent to [facility] in [place]? She states he is due for his injection on Saturday.  Please call once script has been sent

## 2025-05-23 DIAGNOSIS — E66.09 CLASS 1 OBESITY DUE TO EXCESS CALORIES WITH SERIOUS COMORBIDITY AND BODY MASS INDEX (BMI) OF 33.0 TO 33.9 IN ADULT: Primary | ICD-10-CM

## 2025-05-23 DIAGNOSIS — E66.811 CLASS 1 OBESITY DUE TO EXCESS CALORIES WITH SERIOUS COMORBIDITY AND BODY MASS INDEX (BMI) OF 33.0 TO 33.9 IN ADULT: Primary | ICD-10-CM

## 2025-05-23 DIAGNOSIS — G47.33 OSA ON CPAP: ICD-10-CM

## 2025-05-23 RX ORDER — TIRZEPATIDE 10 MG/.5ML
10 INJECTION, SOLUTION SUBCUTANEOUS WEEKLY
Qty: 2 ML | Refills: 0 | Status: SHIPPED | OUTPATIENT
Start: 2025-05-23 | End: 2025-06-20

## 2025-06-16 DIAGNOSIS — E66.09 CLASS 1 OBESITY DUE TO EXCESS CALORIES WITH SERIOUS COMORBIDITY AND BODY MASS INDEX (BMI) OF 33.0 TO 33.9 IN ADULT: ICD-10-CM

## 2025-06-16 DIAGNOSIS — E66.811 CLASS 1 OBESITY DUE TO EXCESS CALORIES WITH SERIOUS COMORBIDITY AND BODY MASS INDEX (BMI) OF 33.0 TO 33.9 IN ADULT: ICD-10-CM

## 2025-06-16 DIAGNOSIS — G47.33 OSA ON CPAP: ICD-10-CM

## 2025-06-17 RX ORDER — TIRZEPATIDE 10 MG/.5ML
INJECTION, SOLUTION SUBCUTANEOUS
Qty: 4 ML | Refills: 0 | Status: SHIPPED | OUTPATIENT
Start: 2025-06-17 | End: 2025-06-25

## 2025-06-18 NOTE — TELEPHONE ENCOUNTER
Wife called asking for a refill of zepbound. Made aware rx was sent to pharmacy yesterday. Wife verbalized understanding.

## 2025-06-19 ENCOUNTER — TELEPHONE (OUTPATIENT)
Age: 55
End: 2025-06-19

## 2025-06-19 NOTE — TELEPHONE ENCOUNTER
Patients wife Verónica called stating his Zepbound 10 mg needs prior auth and the [facility] pharmacy told her that. PA on file states all strengths are approved until September 2025. Called and spoke with the [facility] pharmacist who states the claim is rejecting from [facility] stating new PA is needed.  Please look into this and call Verónica with outcome.

## 2025-06-19 NOTE — TELEPHONE ENCOUNTER
CALLED PATIENTS WIFE AND SHE PROVIDED ME WITH ANOTHER PHONE NUMBER FOR THE PHARMACY. I WAS UNABLE TO REACH THE PHARAMCY WITH THE NUMBER LISTED AND ALSO THE OTHER NUMBER SHE PROVIDED [phone]. SENT THE PHARMACY THE APPROVAL LETTER FROM THE INSURANCE COMPANY. MEDICATION IS APPROVED ZEPBOUD FOR ALL STRENGTHS 3/6/25-9/6/25

## 2025-06-20 NOTE — TELEPHONE ENCOUNTER
PA for Zepbound 10mg SUBMITTED to [employer]    via    []CMM-KEY:   [x]Surescripts-Case ID # [id number]   []Availity-Auth ID # NDC #   []Faxed to plan   []Other website   []Phone call Case ID #     [x]PA sent as URGENT    All office notes, labs and other pertaining documents and studies sent. Clinical questions answered. Awaiting determination from insurance company.     Turnaround time for your insurance to make a decision on your Prior Authorization can take 7-21 business days.

## 2025-06-20 NOTE — TELEPHONE ENCOUNTER
Verónica calling back, she states she was given the phone number to call and it is a new insurance policy.  Phone number for authorization # [phone]

## 2025-06-20 NOTE — TELEPHONE ENCOUNTER
PA for Zepbound 10mg APPROVED     Date(s) approved 06/20/2025-02/15/2026    Patient advised by          []MyChart Message  []Phone call   [x]LMOM  []L/M to call office as no active Communication consent on file  []Unable to leave detailed message as VM not approved on Communication consent       Pharmacy advised by    [x]Fax  []Phone call  []Secure Chat    Specialty Pharmacy    []     Approval letter scanned into Media Yes

## 2025-06-24 ENCOUNTER — TELEPHONE (OUTPATIENT)
Age: 55
End: 2025-06-24

## 2025-06-24 NOTE — TELEPHONE ENCOUNTER
Wife calling stating that the zepbound 15mg should have been sent to the pharmacy. She said he was on the 10mg and did fine. They are requesting the next dose up.

## 2025-06-25 DIAGNOSIS — E66.09 CLASS 1 OBESITY DUE TO EXCESS CALORIES WITH SERIOUS COMORBIDITY AND BODY MASS INDEX (BMI) OF 33.0 TO 33.9 IN ADULT: Primary | ICD-10-CM

## 2025-06-25 DIAGNOSIS — E66.811 CLASS 1 OBESITY DUE TO EXCESS CALORIES WITH SERIOUS COMORBIDITY AND BODY MASS INDEX (BMI) OF 33.0 TO 33.9 IN ADULT: Primary | ICD-10-CM

## 2025-06-25 RX ORDER — TIRZEPATIDE 15 MG/.5ML
15 INJECTION, SOLUTION SUBCUTANEOUS WEEKLY
Qty: 2 ML | Refills: 0 | Status: SHIPPED | OUTPATIENT
Start: 2025-06-25 | End: 2025-06-25

## 2025-06-25 RX ORDER — TIRZEPATIDE 15 MG/.5ML
15 INJECTION, SOLUTION SUBCUTANEOUS WEEKLY
Qty: 6 ML | Refills: 0 | Status: SHIPPED | OUTPATIENT
Start: 2025-06-25 | End: 2025-09-23

## 2025-06-25 NOTE — TELEPHONE ENCOUNTER
Patients wife Verónica called back, they are requesting the increased dose of Zepbound 15 mg be sent to the pharmacy.  Please call Verónica once sent, number on file.

## 2025-07-22 DIAGNOSIS — E66.811 CLASS 1 OBESITY DUE TO EXCESS CALORIES WITH SERIOUS COMORBIDITY AND BODY MASS INDEX (BMI) OF 33.0 TO 33.9 IN ADULT: ICD-10-CM

## 2025-07-22 DIAGNOSIS — E66.09 CLASS 1 OBESITY DUE TO EXCESS CALORIES WITH SERIOUS COMORBIDITY AND BODY MASS INDEX (BMI) OF 33.0 TO 33.9 IN ADULT: ICD-10-CM

## 2025-07-22 NOTE — TELEPHONE ENCOUNTER
Medication:     tirzepatide (Zepbound) 15 mg/0.5 mL auto-injector       Dose/Frequency: Inject 0.5 mL (15 mg total) under the skin once a week    Quantity: 2mL    Pharmacy: [facility] [place]    Office:   [] PCP/Provider -   [x] Speciality/Provider - Endo    Does the patient have enough for 3 days?   [x] Yes   [] No - Send as HP to POD                                   Patients wife called to request refill on Zepbound 15mg weekly

## 2025-07-23 DIAGNOSIS — E66.09 CLASS 1 OBESITY DUE TO EXCESS CALORIES WITH SERIOUS COMORBIDITY AND BODY MASS INDEX (BMI) OF 33.0 TO 33.9 IN ADULT: Primary | ICD-10-CM

## 2025-07-23 DIAGNOSIS — E66.811 CLASS 1 OBESITY DUE TO EXCESS CALORIES WITH SERIOUS COMORBIDITY AND BODY MASS INDEX (BMI) OF 33.0 TO 33.9 IN ADULT: Primary | ICD-10-CM

## 2025-07-23 RX ORDER — TIRZEPATIDE 15 MG/.5ML
15 INJECTION, SOLUTION SUBCUTANEOUS WEEKLY
Qty: 6 ML | Refills: 0 | Status: SHIPPED | OUTPATIENT
Start: 2025-07-23 | End: 2025-07-23

## 2025-08-07 ENCOUNTER — OFFICE VISIT (OUTPATIENT)
Dept: ENDOCRINOLOGY | Facility: CLINIC | Age: 55
End: 2025-08-07
Payer: COMMERCIAL

## 2025-08-07 VITALS
TEMPERATURE: 98.3 F | DIASTOLIC BLOOD PRESSURE: 66 MMHG | HEART RATE: 64 BPM | SYSTOLIC BLOOD PRESSURE: 120 MMHG | BODY MASS INDEX: 29.37 KG/M2 | OXYGEN SATURATION: 98 % | RESPIRATION RATE: 18 BRPM | WEIGHT: 165.8 LBS

## 2025-08-07 DIAGNOSIS — E78.2 MIXED HYPERLIPIDEMIA: ICD-10-CM

## 2025-08-07 DIAGNOSIS — R73.03 PRE-DIABETES: ICD-10-CM

## 2025-08-07 DIAGNOSIS — E66.811 CLASS 1 OBESITY DUE TO EXCESS CALORIES WITH SERIOUS COMORBIDITY AND BODY MASS INDEX (BMI) OF 33.0 TO 33.9 IN ADULT: Primary | ICD-10-CM

## 2025-08-07 DIAGNOSIS — E66.09 CLASS 1 OBESITY DUE TO EXCESS CALORIES WITH SERIOUS COMORBIDITY AND BODY MASS INDEX (BMI) OF 33.0 TO 33.9 IN ADULT: Primary | ICD-10-CM

## 2025-08-07 PROCEDURE — 99214 OFFICE O/P EST MOD 30 MIN: CPT | Performed by: STUDENT IN AN ORGANIZED HEALTH CARE EDUCATION/TRAINING PROGRAM

## 2025-08-09 ENCOUNTER — APPOINTMENT (OUTPATIENT)
Dept: LAB | Facility: CLINIC | Age: 55
End: 2025-08-09
Attending: STUDENT IN AN ORGANIZED HEALTH CARE EDUCATION/TRAINING PROGRAM
Payer: COMMERCIAL

## 2025-08-09 LAB
ALBUMIN SERPL BCG-MCNC: 4 G/DL (ref 3.5–5)
ALP SERPL-CCNC: 64 U/L (ref 34–104)
ALT SERPL W P-5'-P-CCNC: 16 U/L (ref 7–52)
ANION GAP SERPL CALCULATED.3IONS-SCNC: 8 MMOL/L (ref 4–13)
AST SERPL W P-5'-P-CCNC: 18 U/L (ref 13–39)
BILIRUB SERPL-MCNC: 0.35 MG/DL (ref 0.2–1)
BUN SERPL-MCNC: 20 MG/DL (ref 5–25)
CALCIUM SERPL-MCNC: 8.7 MG/DL (ref 8.4–10.2)
CHLORIDE SERPL-SCNC: 107 MMOL/L (ref 96–108)
CHOLEST SERPL-MCNC: 179 MG/DL (ref ?–200)
CO2 SERPL-SCNC: 25 MMOL/L (ref 21–32)
CREAT SERPL-MCNC: 0.63 MG/DL (ref 0.6–1.3)
EST. AVERAGE GLUCOSE BLD GHB EST-MCNC: 114 MG/DL
GFR SERPL CREATININE-BSD FRML MDRD: 109 ML/MIN/1.73SQ M
GLUCOSE P FAST SERPL-MCNC: 82 MG/DL (ref 65–99)
HBA1C MFR BLD: 5.6 %
HDLC SERPL-MCNC: 35 MG/DL
LDLC SERPL CALC-MCNC: 104 MG/DL (ref 0–100)
POTASSIUM SERPL-SCNC: 3.9 MMOL/L (ref 3.5–5.3)
PROT SERPL-MCNC: 6.9 G/DL (ref 6.4–8.4)
SODIUM SERPL-SCNC: 140 MMOL/L (ref 135–147)
TRIGL SERPL-MCNC: 200 MG/DL (ref ?–150)

## 2025-08-09 PROCEDURE — 83036 HEMOGLOBIN GLYCOSYLATED A1C: CPT | Performed by: STUDENT IN AN ORGANIZED HEALTH CARE EDUCATION/TRAINING PROGRAM

## 2025-08-09 PROCEDURE — 80053 COMPREHEN METABOLIC PANEL: CPT | Performed by: STUDENT IN AN ORGANIZED HEALTH CARE EDUCATION/TRAINING PROGRAM

## 2025-08-09 PROCEDURE — 36415 COLL VENOUS BLD VENIPUNCTURE: CPT | Performed by: STUDENT IN AN ORGANIZED HEALTH CARE EDUCATION/TRAINING PROGRAM

## 2025-08-09 PROCEDURE — 80061 LIPID PANEL: CPT | Performed by: STUDENT IN AN ORGANIZED HEALTH CARE EDUCATION/TRAINING PROGRAM
